# Patient Record
Sex: FEMALE | Race: BLACK OR AFRICAN AMERICAN | NOT HISPANIC OR LATINO | Employment: FULL TIME | URBAN - METROPOLITAN AREA
[De-identification: names, ages, dates, MRNs, and addresses within clinical notes are randomized per-mention and may not be internally consistent; named-entity substitution may affect disease eponyms.]

---

## 2024-03-12 ENCOUNTER — HOSPITAL ENCOUNTER (EMERGENCY)
Facility: HOSPITAL | Age: 34
Discharge: HOME | End: 2024-03-13
Payer: COMMERCIAL

## 2024-03-12 VITALS
BODY MASS INDEX: 28.79 KG/M2 | RESPIRATION RATE: 18 BRPM | WEIGHT: 190 LBS | DIASTOLIC BLOOD PRESSURE: 104 MMHG | HEIGHT: 68 IN | OXYGEN SATURATION: 95 % | SYSTOLIC BLOOD PRESSURE: 141 MMHG | HEART RATE: 96 BPM | TEMPERATURE: 97 F

## 2024-03-12 DIAGNOSIS — J45.21 MILD INTERMITTENT ASTHMA WITH EXACERBATION (HHS-HCC): Primary | ICD-10-CM

## 2024-03-12 PROCEDURE — 99285 EMERGENCY DEPT VISIT HI MDM: CPT | Mod: 25

## 2024-03-12 PROCEDURE — 99284 EMERGENCY DEPT VISIT MOD MDM: CPT | Performed by: PHYSICIAN ASSISTANT

## 2024-03-12 PROCEDURE — 94640 AIRWAY INHALATION TREATMENT: CPT

## 2024-03-12 ASSESSMENT — COLUMBIA-SUICIDE SEVERITY RATING SCALE - C-SSRS
1. IN THE PAST MONTH, HAVE YOU WISHED YOU WERE DEAD OR WISHED YOU COULD GO TO SLEEP AND NOT WAKE UP?: NO
6. HAVE YOU EVER DONE ANYTHING, STARTED TO DO ANYTHING, OR PREPARED TO DO ANYTHING TO END YOUR LIFE?: NO
2. HAVE YOU ACTUALLY HAD ANY THOUGHTS OF KILLING YOURSELF?: NO

## 2024-03-13 ENCOUNTER — APPOINTMENT (OUTPATIENT)
Dept: RADIOLOGY | Facility: HOSPITAL | Age: 34
End: 2024-03-13
Payer: COMMERCIAL

## 2024-03-13 PROCEDURE — 71046 X-RAY EXAM CHEST 2 VIEWS: CPT

## 2024-03-13 PROCEDURE — 71046 X-RAY EXAM CHEST 2 VIEWS: CPT | Performed by: RADIOLOGY

## 2024-03-13 PROCEDURE — 2500000002 HC RX 250 W HCPCS SELF ADMINISTERED DRUGS (ALT 637 FOR MEDICARE OP, ALT 636 FOR OP/ED): Performed by: PHYSICIAN ASSISTANT

## 2024-03-13 PROCEDURE — 2500000004 HC RX 250 GENERAL PHARMACY W/ HCPCS (ALT 636 FOR OP/ED): Performed by: PHYSICIAN ASSISTANT

## 2024-03-13 RX ORDER — ALBUTEROL SULFATE 90 UG/1
2 AEROSOL, METERED RESPIRATORY (INHALATION) EVERY 4 HOURS PRN
Qty: 18 G | Refills: 0 | Status: SHIPPED | OUTPATIENT
Start: 2024-03-13 | End: 2024-04-12

## 2024-03-13 RX ORDER — PREDNISONE 20 MG/1
40 TABLET ORAL ONCE
Status: COMPLETED | OUTPATIENT
Start: 2024-03-13 | End: 2024-03-13

## 2024-03-13 RX ORDER — IPRATROPIUM BROMIDE AND ALBUTEROL SULFATE 2.5; .5 MG/3ML; MG/3ML
3 SOLUTION RESPIRATORY (INHALATION) EVERY 20 MIN
Status: COMPLETED | OUTPATIENT
Start: 2024-03-13 | End: 2024-03-13

## 2024-03-13 RX ORDER — PREDNISONE 50 MG/1
50 TABLET ORAL DAILY
Qty: 5 TABLET | Refills: 0 | Status: SHIPPED | OUTPATIENT
Start: 2024-03-13 | End: 2024-03-18

## 2024-03-13 RX ADMIN — PREDNISONE 40 MG: 20 TABLET ORAL at 00:40

## 2024-03-13 RX ADMIN — IPRATROPIUM BROMIDE AND ALBUTEROL SULFATE 3 ML: .5; 3 SOLUTION RESPIRATORY (INHALATION) at 00:40

## 2024-03-13 RX ADMIN — IPRATROPIUM BROMIDE AND ALBUTEROL SULFATE 3 ML: .5; 3 SOLUTION RESPIRATORY (INHALATION) at 00:44

## 2024-03-13 RX ADMIN — IPRATROPIUM BROMIDE AND ALBUTEROL SULFATE 3 ML: .5; 3 SOLUTION RESPIRATORY (INHALATION) at 00:43

## 2024-03-13 NOTE — ED PROVIDER NOTES
HPI   Chief Complaint   Patient presents with    Shortness of Breath       HPI: Patient is a 34-year-old female with history of asthma who presents to the ED for asthma exacerbation that started a week ago.  States that she thinks that her asthma is triggered by the weather changes.  States that she has used an entire rescue inhaler throughout this week.   She notes chest tightness, shortness of breath and cough which started today.  She denies any congestion, fevers, chills or chest pain.  Denies any prior intubations or hospitalizations for her asthma.  ------------------------------------------------------------------------------------------------------------------------------------------  ROS: a ten point review of systems was performed and was negative except as per HPI.  ------------------------------------------------------------------------------------------------------------------------------------------  PMH / PSH: as per HPI, otherwise reviewed   MEDS: as per HPI, otherwise reviewed in EMR  ALLERGIES: as per HPI, otherwise reviewed in EMR  SocH:  as per HPI, otherwise reviewed in EMR  FH:  as per HPI, otherwise reviewed in EMR   ------------------------------------------------------------------------------------------------------------------------------------------  Physical Exam:  VS: As documented in the triage note and EMR flowsheet from this visit was reviewed  General: Well appearing. No acute distress.   Eyes:  Extraocular movements grossly intact. No scleral icterus.   Head: Atraumatic. Normocephalic.     Neck: No meningismus. No gross masses. Full movement through range of motion  ENT: Posterior oropharynx shows no erythema, exudate or edema.  Uvula is midline without edema.  No stridor or trismus  CV: Regular rhythm. No murmurs, rubs, gallops appreciated.   Resp: Expiratory wheezing in all lung fields. No respiratory distress.    GI: Nontender. Soft. No masses. No rebound, rigidity or guarding.    MSK: Symmetric muscle bulk. No gross step offs or deformities.  Skin: Warm, dry. No rashes  Neuro: CN II-VII intact. A&O x3. Speech fluent. Alert. Moving all extremities. Ambulates with normal gait  Psych: Appropriate mood and affect for situation  ------------------------------------------------------------------------------------------------------------------------------------------  Hospital Course / Medical Decision Making: Patient is a 34-year-old female who presents to the ED for asthma exacerbation that started this week.  On examination, patient well-appearing.  Expiratory wheezing noted on cardiopulmonary examination.  Vitals are stable.  She is speaking in full sentences, no acute respiratory distress.  Patient administered stacked DuoNeb treatments, prednisone.  Chest x-ray shows no acute cardiopulmonary process.On reassessment, patient feels markedly improved.  Lung sounds are clear.Written prescription for new albuterol inhaler and prednisone burst. Patient has remained hemodynamically stable throughout the course of their ED stay.  Patient is home-going.  Patient advised to return to the ED for any worsening symptoms.  Advised to follow-up with PCP.  Patient was discharged in stable condition.                            [place] Coma Scale Score: 15                     Patient History   No past medical history on file.  Past Surgical History:   Procedure Laterality Date     SECTION, CLASSIC  2016     Section    CT ANGIO NECK W AND WO IV CONTRAST  3/11/2013    CT NECK ANGIO W AND WO IV CONTRAST 3/11/2013 [facility] CLINICAL LEGACY    TUBAL LIGATION  2016    Tubal Ligation     No family history on file.  Social History     Tobacco Use    Smoking status: Not on file    Smokeless tobacco: Not on file   Substance Use Topics    Alcohol use: Not on file    Drug use: Not on file       Physical Exam   ED Triage Vitals [24 2338]   Temperature Heart Rate Respirations BP   36.1 °C (97  °F) 96 18 (!) 141/104      Pulse Ox Temp Source Heart Rate Source Patient Position   95 % Temporal -- --      BP Location FiO2 (%)     -- --       Physical Exam    ED Course & MDM   Diagnoses as of 03/13/24 0227   Mild intermittent asthma with exacerbation       Medical Decision Making      Procedure  Procedures     Keira Covarrubias PA-C  03/13/24 0225

## 2024-03-13 NOTE — ED TRIAGE NOTES
Patient presents to the ED for asthma, states her inhaler ran out. States she has been using it more than usual. Denies CP

## 2024-03-16 ENCOUNTER — HOSPITAL ENCOUNTER (EMERGENCY)
Facility: HOSPITAL | Age: 34
Discharge: HOME | End: 2024-03-17
Attending: EMERGENCY MEDICINE
Payer: COMMERCIAL

## 2024-03-16 VITALS
HEIGHT: 68 IN | TEMPERATURE: 97.2 F | OXYGEN SATURATION: 99 % | DIASTOLIC BLOOD PRESSURE: 97 MMHG | HEART RATE: 92 BPM | WEIGHT: 190 LBS | RESPIRATION RATE: 18 BRPM | SYSTOLIC BLOOD PRESSURE: 143 MMHG | BODY MASS INDEX: 28.79 KG/M2

## 2024-03-16 DIAGNOSIS — R30.0 DYSURIA: Primary | ICD-10-CM

## 2024-03-16 PROCEDURE — 87661 TRICHOMONAS VAGINALIS AMPLIF: CPT | Mod: 59

## 2024-03-16 PROCEDURE — 87389 HIV-1 AG W/HIV-1&-2 AB AG IA: CPT

## 2024-03-16 PROCEDURE — 99283 EMERGENCY DEPT VISIT LOW MDM: CPT

## 2024-03-16 PROCEDURE — 99284 EMERGENCY DEPT VISIT MOD MDM: CPT | Performed by: EMERGENCY MEDICINE

## 2024-03-16 PROCEDURE — 87210 SMEAR WET MOUNT SALINE/INK: CPT | Mod: 59

## 2024-03-16 PROCEDURE — 87800 DETECT AGNT MULT DNA DIREC: CPT

## 2024-03-16 PROCEDURE — 86780 TREPONEMA PALLIDUM: CPT

## 2024-03-16 PROCEDURE — 36415 COLL VENOUS BLD VENIPUNCTURE: CPT

## 2024-03-16 PROCEDURE — 81025 URINE PREGNANCY TEST: CPT

## 2024-03-16 PROCEDURE — 81003 URINALYSIS AUTO W/O SCOPE: CPT

## 2024-03-16 ASSESSMENT — COLUMBIA-SUICIDE SEVERITY RATING SCALE - C-SSRS
1. IN THE PAST MONTH, HAVE YOU WISHED YOU WERE DEAD OR WISHED YOU COULD GO TO SLEEP AND NOT WAKE UP?: NO
2. HAVE YOU ACTUALLY HAD ANY THOUGHTS OF KILLING YOURSELF?: NO
6. HAVE YOU EVER DONE ANYTHING, STARTED TO DO ANYTHING, OR PREPARED TO DO ANYTHING TO END YOUR LIFE?: NO

## 2024-03-16 ASSESSMENT — PAIN - FUNCTIONAL ASSESSMENT: PAIN_FUNCTIONAL_ASSESSMENT: 0-10

## 2024-03-16 ASSESSMENT — PAIN DESCRIPTION - PAIN TYPE: TYPE: ACUTE PAIN

## 2024-03-16 ASSESSMENT — PAIN SCALES - GENERAL: PAINLEVEL_OUTOF10: 8

## 2024-03-17 LAB
APPEARANCE UR: CLEAR
BILIRUB UR STRIP.AUTO-MCNC: NEGATIVE MG/DL
C TRACH RRNA SPEC QL NAA+PROBE: NEGATIVE
CLUE CELLS SPEC QL WET PREP: NORMAL
COLOR UR: ABNORMAL
GLUCOSE UR STRIP.AUTO-MCNC: NORMAL MG/DL
HIV 1+2 AB+HIV1 P24 AG SERPL QL IA: NONREACTIVE
HOLD SPECIMEN: NORMAL
KETONES UR STRIP.AUTO-MCNC: ABNORMAL MG/DL
LEUKOCYTE ESTERASE UR QL STRIP.AUTO: NEGATIVE
N GONORRHOEA DNA SPEC QL PROBE+SIG AMP: NEGATIVE
NITRITE UR QL STRIP.AUTO: NEGATIVE
PH UR STRIP.AUTO: 6 [PH]
PREGNANCY TEST URINE, POC: NEGATIVE
PROT UR STRIP.AUTO-MCNC: NEGATIVE MG/DL
RBC # UR STRIP.AUTO: NEGATIVE /UL
SP GR UR STRIP.AUTO: 1.02
T VAGINALIS RRNA SPEC QL NAA+PROBE: NEGATIVE
T VAGINALIS SPEC QL WET PREP: NORMAL
TREPONEMA PALLIDUM IGG+IGM AB [PRESENCE] IN SERUM OR PLASMA BY IMMUNOASSAY: NONREACTIVE
TRICHOMONAS REFLEX COMMENT: NORMAL
UROBILINOGEN UR STRIP.AUTO-MCNC: NORMAL MG/DL
WBC VAG QL WET PREP: NORMAL
YEAST VAG QL WET PREP: NORMAL

## 2024-03-17 ASSESSMENT — PAIN SCALES - GENERAL: PAINLEVEL_OUTOF10: 2

## 2024-03-17 NOTE — ED TRIAGE NOTES
Pt to ED with c/o dysuria and lower abdominal pain since today. LMP 3/3/2024.     Hx of asthma

## 2024-03-17 NOTE — ED PROVIDER NOTES
HPI   Chief Complaint   Patient presents with    Female Dysuria    Abdominal Pain       34-year-old female no stated past medical history presents today for dysuria vaginal pain and suprapubic pain.  Patient states that she has been having the symptoms for roughly 1 day, initially started with some discomfort this morning, but has been getting progressively worse.  She does also endorse an increase in her discharge, with a change in odor, but no change in color.  She denies any hematuria or urinary frequency.  She denies any nausea, vomiting, flank pain.  She denies any sore throat, fever, headache, cough, shortness of breath, chest pain.  She denies any loss of bowel or bladder incontinence.  She states that she feels like she may have a UTI.  She endorses that her LMP was 2 weeks ago, was normal in duration and was not shorter or lighter than normal.      History provided by:  Patient   used: No                        [place] Coma Scale Score: 15                     Patient History   History reviewed. No pertinent past medical history.  Past Surgical History:   Procedure Laterality Date     SECTION, CLASSIC  2016     Section    CT ANGIO NECK  3/11/2013    CT NECK ANGIO W AND WO IV CONTRAST 3/11/2013 [facility] CLINICAL LEGACY    TUBAL LIGATION  2016    Tubal Ligation     No family history on file.  Social History     Tobacco Use    Smoking status: Not on file    Smokeless tobacco: Not on file   Substance Use Topics    Alcohol use: Not on file    Drug use: Not on file       Physical Exam   ED Triage Vitals [24 2259]   Temperature Heart Rate Respirations BP   36.2 °C (97.2 °F) 92 18 (!) 143/97      Pulse Ox Temp Source Heart Rate Source Patient Position   99 % Temporal -- Sitting      BP Location FiO2 (%)     Right arm --       Physical Exam  Constitutional:       General: She is not in acute distress.     Appearance: Normal appearance. She is not ill-appearing or  diaphoretic.   HENT:      Head: Normocephalic and atraumatic.      Mouth/Throat:      Mouth: Mucous membranes are moist.      Pharynx: No oropharyngeal exudate or posterior oropharyngeal erythema.   Eyes:      General: No scleral icterus.     Extraocular Movements: Extraocular movements intact.      Pupils: Pupils are equal, round, and reactive to light.   Cardiovascular:      Rate and Rhythm: Normal rate and regular rhythm.      Pulses: Normal pulses.      Heart sounds: Normal heart sounds. No murmur heard.     No gallop.   Pulmonary:      Effort: Pulmonary effort is normal. No respiratory distress.      Breath sounds: Normal breath sounds. No stridor. No wheezing, rhonchi or rales.   Abdominal:      General: Bowel sounds are normal. There is no distension.      Palpations: Abdomen is soft. There is no mass.      Tenderness: There is abdominal tenderness in the suprapubic area and left lower quadrant. There is no right CVA tenderness, left CVA tenderness, guarding or rebound. Negative signs include Blanton's sign and McBurney's sign.      Hernia: No hernia is present.   Genitourinary:     Comments: Deferred by patient  Musculoskeletal:         General: No swelling, deformity or signs of injury. Normal range of motion.      Cervical back: Normal range of motion and neck supple. No tenderness.   Skin:     General: Skin is warm.      Capillary Refill: Capillary refill takes less than 2 seconds.      Coloration: Skin is not pale.      Findings: No erythema, lesion or rash.   Neurological:      General: No focal deficit present.      Mental Status: She is alert and oriented to person, place, and time. Mental status is at baseline.      Cranial Nerves: No cranial nerve deficit.      Motor: No weakness.   Psychiatric:         Mood and Affect: Mood normal.         Behavior: Behavior normal.         ED Course & MDM   Diagnoses as of 03/17/24 0152   Dysuria       Medical Decision Making  34-year-old female no stated past  medical history presents today for suprapubic pain and vaginal pain.  Given the rather acute onset the patient's symptoms, my concern would be that this is of infectious etiology.  Given this, we will have her self swab for trichomonas and wet prep.  We will also test her for GC and chlamydia, as well as pregnancy.  Will also get a UA on her to confirm she does not have a UTI.  Given the fact that the patient has no systemic symptoms, no fever, no tachycardia, normal blood pressure, my concern for this being Julius is low.  Addition, she has no back pain that would make me more concerned for pyelonephritis.  Once we get the patient's lab results, we will treat her for her underlying illness.  I did offer her empiric treatment, which she declined.  Patient's labs came back normal, so we will discharge her at this time.  I will recommend she use Azo to see if this helps with her discomfort. Patient comfortable with this plan.  All questions answered prior to discharge, return precaution provided.    Amount and/or Complexity of Data Reviewed  Labs: ordered.        Procedure  Procedures    Tae Tiwari MD  Resident  03/17/24 0132       Tae Tiwari MD  Resident  03/17/24 0152    --------------------------------    This patient was seen by the resident physician. I have seen and examined the patient, agree with the workup, evaluation, management and diagnosis. The care plan has been discussed and I concur.    My assessment reveals the following:    HPI:  Patient is a 35 y/o female presenting with suprapubic pain assoc with dysuria and white frothy vaginal discharge that is foul smelling. Duration of symptoms x 1 day. No F/C. No CP/SOB. No diarrhea or constipation. LMP 2 weeks ago. No back pain.     PE:  Vital signs reviewed in nursing triage note, EMR flowsheets, and at patient's bedside  GEN: Patient is awake, alert, calm, cooperative, and in mild painful and  distress.  HEAD: Normocephalic and atraumatic.  EYES:  Anicteric sclera.  MOUTH: Mucous membranes moist.  CV: Regular rate and rhythm. (+) s1/s2. No murmurs/rubs/gallops.  PULM: CTAB. No wheezes, rales, or crackles.  GI: Soft, mild suprapubic TTP, non-distended without rebound or guarding.  EXT: No deformities noted.   NEURO: Moves all extremities.   SKIN: Warm, dry. No erythema or ecchymosis.    Labs Reviewed   URINALYSIS WITH REFLEX CULTURE AND MICROSCOPIC - Abnormal       Result Value    Color, Urine Light-Yellow      Appearance, Urine Clear      Specific Gravity, Urine 1.024      pH, Urine 6.0      Protein, Urine NEGATIVE      Glucose, Urine Normal      Blood, Urine NEGATIVE      Ketones, Urine TRACE (*)     Bilirubin, Urine NEGATIVE      Urobilinogen, Urine Normal      Nitrite, Urine NEGATIVE      Leukocyte Esterase, Urine NEGATIVE     SYPHILIS SCREENING WITH REFLEX - Normal    Syphilis Total Ab Nonreactive     HIV 1/2 ANTIGEN/ANTIBODY SCREEN WIH REFLEX TO CONFIRMATION - Normal    HIV 1/2 Antigen/Antibody Screen with Reflex to Confirmation Nonreactive      Narrative:     HIV Ag/Ab screen is performed using the Siemens Andre Phillipe HIV Ag/Ab Combo assay which detects the presence of HIV p24 antigen as well as antibodies to HIV-1 (Group M and O) and HIV-2.  No laboratory evidence of HIV infection. If acute HIV infection is suspected, consider testing for HIV RNA by PCR (viral load).   C. TRACHOMATIS + N. GONORRHOEAE, AMPLIFIED - Normal    Neisseria gonorrhea,Amplified Negative      Chlamydia trachomatis, Amplified Negative      Narrative:     The APTIMA Combo 2 assay is FDA-approved NAAT using target capture for the in vitro qualitative detection and differentiation of ribosomal RNA (rRNA) for Chlamydia trachomatis and Neisseria gonorrhoeae testing on clinician-collected endocervical, PreservCyt solution liquid Pap specimens, vaginal, throat, rectal, and male urethral swab specimens; patient-collected vaginal swab specimens, and female and male urine specimens from  symptomatic and asymptomatic individuals. Samples from all other sites are not validated for this method.   TRICH VAGINALIS, AMPLIFIED - Normal    Trichomonas Vaginalis Negative      Narrative:     The APTIMA Trichomonas vaginalis assay is FDA-approved for  testing on female endocervical swabs, vaginal swabs, and  ThinPrep liquid pap samples. Performance characteristics  for Trichomonas vaginalis on specific non-FDA-approved  sample types (female and male urine and male urethral  swabs) have been validated by [facility]. This laboratory is certified by  CLIA to perform high complexity testing. Samples from all  other sites are not validated for this method.   POCT PREGNANCY, URINE - Normal    Preg Test, Ur Negative     TRICHOMONAS WET PREP REFLEX TO PCR    Trichomonas None Seen      Clue Cells None Seen      Yeast None Seen      WBC 1-2      Trichomonas reflex comment        Value: Trichomonas was not seen by wet prep. Reflex Trichomonas vaginalis by Amplified Detection.   URINALYSIS WITH REFLEX CULTURE AND MICROSCOPIC    Narrative:     The following orders were created for panel order Urinalysis with Reflex Culture and Microscopic.  Procedure                               Abnormality         Status                     ---------                               -----------         ------                     Urinalysis with Reflex C...[[id number]]  Abnormal            Final result               Extra Urine Gray Tube[[id number]]                            Final result                 Please view results for these tests on the individual orders.   EXTRA URINE GRAY TUBE    Extra Tube Hold for add-ons.         Medical Decision Making:  - UA  - STD testing  - Re-evaluation    Differential Diagnoses Considered: UTI, STD    Escalation of Care:  Appropriate for outpatient management    MD Brenden Danielle MD  03/17/24 8023

## 2024-03-17 NOTE — DISCHARGE INSTRUCTIONS
You were seen today for vaginal discomfort and burning while urinating, your urine as well as swabs were all negative.  Given this, we feel it is likely appropriate to discharge at this time.  I would follow-up with your primary care doctor or a gynecologist for further labs and examination if the symptoms do not leo.  You could potentially attempt trying Azo, which is an over-the-counter medication that can help with the symptoms.

## 2024-06-25 ENCOUNTER — LAB REQUISITION (OUTPATIENT)
Dept: LAB | Facility: HOSPITAL | Age: 34
End: 2024-06-25
Payer: COMMERCIAL

## 2024-06-25 DIAGNOSIS — R39.89 OTHER SYMPTOMS AND SIGNS INVOLVING THE GENITOURINARY SYSTEM: ICD-10-CM

## 2024-06-25 PROCEDURE — 87661 TRICHOMONAS VAGINALIS AMPLIF: CPT

## 2024-06-25 PROCEDURE — 87102 FUNGUS ISOLATION CULTURE: CPT

## 2024-06-25 PROCEDURE — 87591 N.GONORRHOEAE DNA AMP PROB: CPT

## 2024-06-25 PROCEDURE — 87491 CHLMYD TRACH DNA AMP PROBE: CPT

## 2024-06-26 LAB
C TRACH RRNA SPEC QL NAA+PROBE: NEGATIVE
N GONORRHOEA DNA SPEC QL PROBE+SIG AMP: NEGATIVE
T VAGINALIS RRNA SPEC QL NAA+PROBE: NEGATIVE

## 2024-06-27 LAB — YEAST SPEC QL CULT: ABNORMAL

## 2024-08-14 ENCOUNTER — APPOINTMENT (OUTPATIENT)
Dept: RADIOLOGY | Facility: HOSPITAL | Age: 34
End: 2024-08-14
Payer: COMMERCIAL

## 2024-08-14 ENCOUNTER — HOSPITAL ENCOUNTER (EMERGENCY)
Facility: HOSPITAL | Age: 34
Discharge: HOME | End: 2024-08-14
Payer: COMMERCIAL

## 2024-08-14 VITALS
RESPIRATION RATE: 18 BRPM | DIASTOLIC BLOOD PRESSURE: 89 MMHG | TEMPERATURE: 99 F | WEIGHT: 200 LBS | SYSTOLIC BLOOD PRESSURE: 143 MMHG | OXYGEN SATURATION: 98 % | BODY MASS INDEX: 30.31 KG/M2 | HEIGHT: 68 IN | HEART RATE: 80 BPM

## 2024-08-14 DIAGNOSIS — J45.21 MILD INTERMITTENT ASTHMA WITH EXACERBATION (HHS-HCC): ICD-10-CM

## 2024-08-14 DIAGNOSIS — J40 BRONCHITIS: Primary | ICD-10-CM

## 2024-08-14 LAB — SARS-COV-2 RNA RESP QL NAA+PROBE: NOT DETECTED

## 2024-08-14 PROCEDURE — 87635 SARS-COV-2 COVID-19 AMP PRB: CPT | Performed by: NURSE PRACTITIONER

## 2024-08-14 PROCEDURE — 2500000004 HC RX 250 GENERAL PHARMACY W/ HCPCS (ALT 636 FOR OP/ED): Mod: SE | Performed by: NURSE PRACTITIONER

## 2024-08-14 PROCEDURE — 94640 AIRWAY INHALATION TREATMENT: CPT

## 2024-08-14 PROCEDURE — 71046 X-RAY EXAM CHEST 2 VIEWS: CPT | Performed by: STUDENT IN AN ORGANIZED HEALTH CARE EDUCATION/TRAINING PROGRAM

## 2024-08-14 PROCEDURE — 99283 EMERGENCY DEPT VISIT LOW MDM: CPT | Mod: 25

## 2024-08-14 PROCEDURE — 2500000002 HC RX 250 W HCPCS SELF ADMINISTERED DRUGS (ALT 637 FOR MEDICARE OP, ALT 636 FOR OP/ED): Mod: SE | Performed by: NURSE PRACTITIONER

## 2024-08-14 PROCEDURE — 99282 EMERGENCY DEPT VISIT SF MDM: CPT

## 2024-08-14 PROCEDURE — 71046 X-RAY EXAM CHEST 2 VIEWS: CPT

## 2024-08-14 RX ORDER — ALBUTEROL SULFATE 90 UG/1
2 INHALANT RESPIRATORY (INHALATION) EVERY 4 HOURS PRN
Qty: 18 G | Refills: 0 | Status: SHIPPED | OUTPATIENT
Start: 2024-08-14 | End: 2024-09-13

## 2024-08-14 RX ORDER — PREDNISONE 20 MG/1
60 TABLET ORAL ONCE
Status: COMPLETED | OUTPATIENT
Start: 2024-08-14 | End: 2024-08-14

## 2024-08-14 RX ORDER — ALBUTEROL SULFATE 90 UG/1
2 INHALANT RESPIRATORY (INHALATION) EVERY 4 HOURS PRN
Qty: 18 G | Refills: 0 | Status: SHIPPED | OUTPATIENT
Start: 2024-08-14 | End: 2024-08-14

## 2024-08-14 RX ORDER — IPRATROPIUM BROMIDE AND ALBUTEROL SULFATE 2.5; .5 MG/3ML; MG/3ML
3 SOLUTION RESPIRATORY (INHALATION) ONCE
Status: COMPLETED | OUTPATIENT
Start: 2024-08-14 | End: 2024-08-14

## 2024-08-14 RX ORDER — PREDNISONE 20 MG/1
40 TABLET ORAL DAILY
Qty: 10 TABLET | Refills: 0 | Status: SHIPPED | OUTPATIENT
Start: 2024-08-14 | End: 2024-08-19

## 2024-08-14 RX ORDER — ALBUTEROL SULFATE 0.83 MG/ML
2.5 SOLUTION RESPIRATORY (INHALATION) EVERY 20 MIN
Status: COMPLETED | OUTPATIENT
Start: 2024-08-14 | End: 2024-08-14

## 2024-08-14 RX ORDER — BENZONATATE 100 MG/1
100 CAPSULE ORAL EVERY 8 HOURS
Qty: 21 CAPSULE | Refills: 0 | Status: SHIPPED | OUTPATIENT
Start: 2024-08-14 | End: 2024-08-21

## 2024-08-14 ASSESSMENT — ENCOUNTER SYMPTOMS
COUGH: 1
SHORTNESS OF BREATH: 1
WHEEZING: 1

## 2024-08-14 ASSESSMENT — PAIN SCALES - GENERAL: PAINLEVEL_OUTOF10: 0 - NO PAIN

## 2024-08-14 ASSESSMENT — PAIN - FUNCTIONAL ASSESSMENT: PAIN_FUNCTIONAL_ASSESSMENT: 0-10

## 2024-08-14 NOTE — ED PROVIDER NOTES
Emergency Department Encounter  [facility] EMERGENCY MEDICINE    Patient: Willy Sumner  MRN: [id number]  : 1990  Date of Evaluation: 2024  ED Provider: BREANNA Dumont      Chief Complaint       Chief Complaint   Patient presents with    Shortness of Breath        Limitations to History: none  Historian: patient  Records reviewed: EMR inpatient and outpatient notes, Care Everywhere    This is a 34-year-old female with a PMH of asthma and bronchitis who presents to the emergency room with shortness of breath and a cough.  Patient reports symptoms started last night.  Patient states that she has been using her albuterol inhaler without any relief of symptoms.  Denies any known COVID exposure.  Denies any fevers or chills.  Patient states that she has a productive cough with clear sputum. Denies any recent travel. Denies any lower extremity swelling or pain.    PMH: Asthma, bronchitis  PSH: , tubal ligation  Allergies: Reviewed per EMR  Social HX: + smoker, denies alcohol or drug use.  Family HX: No family history pertinent to current presenting problem  Medications: Reviewed per EMR    ROS:     Review of Systems   Respiratory:  Positive for cough, shortness of breath and wheezing.      14 systems reviewed and otherwise acutely negative except as in the [place].        Past History   No past medical history on file.  Past Surgical History:   Procedure Laterality Date     SECTION, CLASSIC  2016     Section    CT ANGIO NECK  3/11/2013    CT NECK ANGIO W AND WO IV CONTRAST 3/11/2013 [facility] CLINICAL LEGACY    TUBAL LIGATION  2016    Tubal Ligation         Medications/Allergies     Previous Medications    ALBUTEROL 90 MCG/ACTUATION INHALER    Inhale 2 puffs every 4 hours if needed for wheezing.     Allergies   Allergen Reactions    Iodine Hives    Shellfish Derived Hives        Physical Exam       ED Triage Vitals [24 1854]   Temperature Heart  Rate Respirations BP   37.2 °C (99 °F) 80 18 143/89      Pulse Ox Temp Source Heart Rate Source Patient Position   98 % Temporal -- --      BP Location FiO2 (%)     -- --       Physical Exam:    Appearance: Alert, oriented , cooperative,  in no acute distress. Well nourished & well hydrated.    Skin: Intact,  dry skin, no lesions, rash, petechiae or purpura.     ENT: Hearing grossly intact. External auditory canals patent, tympanic membranes intact with visible landmarks.     Neck: Supple, without meningismus.     Pulmonary: Mild expiratory wheezing throughout. No accessory muscle use or stridor.    Cardiac: Normal S1, S2 without murmur, rub, gallop or extrasystole. No JVD, Carotids without bruits.    Abdomen: Soft, nontender, active bowel sounds.  No palpable organomegaly.  No rebound or guarding.  No CVA tenderness.    Musculoskeletal: Full range of motion. no pain, edema, or deformity. Pulses full and equal. No cyanosis, clubbing, or edema.    Neurological:  Normal sensation, no weakness, no focal findings identified.    Psychiatric: Appropriate mood and affect.       Diagnostics   Labs:  Results for orders placed or performed during the hospital encounter of 08/14/24 (from the past 24 hour(s))   Sars-CoV-2 PCR   Result Value Ref Range    Coronavirus 2019, PCR Not Detected Not Detected      Radiographs:  XR chest 2 views   Final Result   1.  No evidence of acute cardiopulmonary process.        I personally reviewed the images/study and I agree with the findings   as stated by resident Shai Sheikh. This study was interpreted   at [facility], [place], [place].        MACRO:   None        Signed by: Shelby Diane 8/14/2024 8:47 PM   Dictation workstation:   [id number]                Assessment   In brief, Willy Sumner is a 34 y.o. female who presented to the emergency department with shortness of breath and a cough.        ED Course/MDM     Diagnoses as of  "08/14/24 2057   Bronchitis      Visit Vitals  /89   Pulse 80   Temp 37.2 °C (99 °F) (Temporal)   Resp 18   Ht 1.727 m (5' 8\")   Wt 90.7 kg (200 lb)   SpO2 98%   BMI 30.41 kg/m²   BSA 2.09 m²       Medications   ipratropium-albuteroL (Duo-Neb) 0.5-2.5 mg/3 mL nebulizer solution 3 mL (3 mL nebulization Given 8/14/24 1924)   albuterol 2.5 mg /3 mL (0.083 %) nebulizer solution 2.5 mg (2.5 mg nebulization Given 8/14/24 1929)   predniSONE (Deltasone) tablet 60 mg (60 mg oral Given 8/14/24 1924)       Patient remained stable while in the emergency department. Previous outpatient and ED records were reviewed. Outside records were reviewed.  Patient had mild expiratory wheezing on exam.  Differentials include bronchitis, COVID, pneumonia, influenza.  COVID swab was obtained, not detected.  Chest x-ray was obtained which shows no evidence of acute cardiopulmonary process.  Patient received a DuoNeb, 3 albuterol nebulizers and prednisone 60 mg p.o. once for symptoms.  Patient did have improvement of symptoms.  Vital signs were stable.  Symptoms likely due to bronchitis.  Patient was prescribed an albuterol inhaler, prednisone for 5 days and Tessalon Perles for cough.  Patient was advised to follow-up with her primary care doctor and return the emergency room with worsening symptoms.    Final Impression      1. Bronchitis    2. Mild intermittent asthma with exacerbation ([facility])          DISPOSITION  Disposition: Discharge home    Comment: Please note this report has been produced using speech recognition software and may contain errors related to that system including errors in grammar, punctuation, and spelling, as well as words and phrases that may be inappropriate.  If there are any questions or concerns please feel free to contact the dictating provider for clarification.    BREANNA Dumont APRN-CNP  08/14/24 2059    "

## 2024-10-10 ENCOUNTER — HOSPITAL ENCOUNTER (OUTPATIENT)
Dept: RADIOLOGY | Facility: HOSPITAL | Age: 34
Discharge: HOME | End: 2024-10-10
Payer: COMMERCIAL

## 2024-10-10 VITALS — BODY MASS INDEX: 30.31 KG/M2 | HEIGHT: 68 IN | WEIGHT: 200 LBS

## 2024-10-10 DIAGNOSIS — N63.0 UNSPECIFIED LUMP IN UNSPECIFIED BREAST: ICD-10-CM

## 2024-10-10 DIAGNOSIS — Z12.31 SCREENING MAMMOGRAM FOR BREAST CANCER: ICD-10-CM

## 2024-10-10 PROCEDURE — 76981 USE PARENCHYMA: CPT | Mod: 50,RT,LT

## 2024-10-10 PROCEDURE — 76642 ULTRASOUND BREAST LIMITED: CPT | Mod: 50

## 2024-10-10 PROCEDURE — 77062 BREAST TOMOSYNTHESIS BI: CPT

## 2024-10-14 ENCOUNTER — OFFICE VISIT (OUTPATIENT)
Dept: SURGICAL ONCOLOGY | Facility: HOSPITAL | Age: 34
End: 2024-10-14
Payer: COMMERCIAL

## 2024-10-14 VITALS
HEART RATE: 74 BPM | WEIGHT: 208.34 LBS | DIASTOLIC BLOOD PRESSURE: 90 MMHG | HEIGHT: 68 IN | SYSTOLIC BLOOD PRESSURE: 134 MMHG | TEMPERATURE: 97.2 F | RESPIRATION RATE: 17 BRPM | OXYGEN SATURATION: 97 % | BODY MASS INDEX: 31.57 KG/M2

## 2024-10-14 DIAGNOSIS — Q83.1 ACCESSORY BREAST TISSUE OF AXILLA: Primary | ICD-10-CM

## 2024-10-14 PROCEDURE — 99213 OFFICE O/P EST LOW 20 MIN: CPT | Performed by: NURSE PRACTITIONER

## 2024-10-14 PROCEDURE — 3008F BODY MASS INDEX DOCD: CPT | Performed by: NURSE PRACTITIONER

## 2024-10-14 PROCEDURE — 99203 OFFICE O/P NEW LOW 30 MIN: CPT | Performed by: NURSE PRACTITIONER

## 2024-10-14 ASSESSMENT — PAIN SCALES - GENERAL: PAINLEVEL: 4

## 2024-10-14 NOTE — PROGRESS NOTES
[facility] CANCER [place]  Willy Smuner female   1990 34 y.o.   [id number]      Chief Complaint  New patient, bilateral axillary lumps.    History Of Present Illness  Willy Sumner is a 34 y.o. AA female presenting to the breast center with bilateral axillary lumps. They are not painful but they bother her and she would like them removed. She denies breast surgery or biopsy. She has family history of breast cancer, see below.    BREAST IMAGING: 10/10/2024 Bilateral diagnostic mammogram with bilateral ultrasound, indicates BI-RADS Category 2.    REPRODUCTIVE HISTORY: menarche age 11, , first birth age 34,  x 3 months, no OCP's, premenopausal, LMP monthly, heterogeneously dense                              FAMILY CANCER HISTORY:   Maternal Grandmother: Breast cancer  Maternal Aunt (1): Breast cancer  Maternal Aunt (2): Breast cancer     Review of Systems  Constitutional:  Negative for appetite change, fatigue, fever and unexpected weight change.   HENT:  Negative for ear pain, hearing loss, nosebleeds, sore throat and trouble swallowing.    Eyes:  Negative for discharge, itching and visual disturbance.   Breast: As stated in HPI.  Respiratory:  Negative for cough, chest tightness and shortness of breath.    Cardiovascular:  Negative for chest pain, palpitations and leg swelling.   Gastrointestinal:  Negative for abdominal pain, constipation, diarrhea and nausea.   Endocrine: Negative for cold intolerance and heat intolerance.   Genitourinary:  Negative for dysuria, frequency, hematuria, pelvic pain and vaginal bleeding.   Musculoskeletal:  Negative for arthralgias, back pain, gait problem, joint swelling and myalgias.   Skin:  Negative for color change and rash.   Allergic/Immunologic: Negative for environmental allergies and food allergies.   Neurological:  Negative for dizziness, tremors, speech difficulty, weakness, numbness and headaches.   Hematological:  Does not bruise/bleed easily.    Psychiatric/Behavioral:  Negative for agitation, dysphoric mood and sleep disturbance. The patient is not nervous/anxious.       Past Medical History  She has no past medical history on file.    Surgical History  She has a past surgical history that includes Tubal ligation (2016);  section, classic (2016); and CT angio neck (3/11/2013).    Family History  Cancer-related family history includes Breast cancer in her maternal grandmother and mother's sister.     Social History  She has no history on file for tobacco use, alcohol use, and drug use.    Allergies  Iodine and Shellfish derived    Medications  Current Outpatient Medications   Medication Instructions    albuterol 90 mcg/actuation inhaler 2 puffs, inhalation, Every 4 hours PRN       Last Recorded Vitals  Vitals:    10/14/24 0958   BP: 134/90   Pulse: 74   Resp: 17   Temp: 36.2 °C (97.2 °F)   SpO2: 97%        Physical Exam  Patient is alert and oriented x3 and in a relaxed and appropriate mood. Her gait is steady and hand grasps are equal. Sclera is clear. The breasts are nearly symmetrical. The tissue is soft without palpable abnormalities, discrete nodules or masses. Bilateral axillary excessory tissue. The skin and nipples appear normal. There is no cervical, supraclavicular or axillary lymphadenopathy.       Relevant Results and Imaging  BI mammo bilateral diagnostic tomosynthesis 10/10/2024  BI US breast limited bilateral 10/10/2024    Narrative  Interpreted By:  Chapito Nicole and Liller Gregory  STUDY:  BI US BREAST LIMITED BILATERAL; BI MAMMO BILATERAL DIAGNOSTIC  TOMOSYNTHESIS;  10/10/2024 12:23 pm; 10/10/2024 11:50 am    ACCESSION NUMBER(S):  [id number]; [id number]    ORDERING CLINICIAN:  INTERFACE UNSPECIFIELDPROVIDER    INDICATION:  b    ,N63.0 Unspecified lump in unspecified breast    COMPARISON:  Mammogram 2019 and 2018    FINDINGS:  MAMMOGRAPHY: 2D and tomosynthesis images were reviewed at 1 mm  slice  thickness.    Density:  The breasts are heterogeneously dense, which may obscure  small masses.    BBs were placed at the sites of palpable concern bilaterally.  Right-greater-than-left axillary breast tissue is noted. No  suspicious masses or calcifications are identified within either  breast.    ULTRASOUND: Targeted ultrasound was performed of the bilateral  breasts at the sites of palpable concern bilaterally by a registered  sonographer with elastography.    Accessory breast tissue is seen within the bilateral axilla. No  sonographic evidence of malignancy is identified at the palpable  areas of concern.    Impression  No mammographic evidence of malignancy bilaterally. Areas of palpable  concern relates to accessory breast tissue. The patient did report  that the accessory breast tissue was bothersome. We discussed the  option of surgical consultation.    Otherwise, annual screening is recommended at age 40 unless  clinically indicated earlier.    BI-RADS CATEGORY:  BI-RADS Category:  2 Benign.  Recommendation:  Annual Screening.  Recommended Date:  Age 40 or based on Risk-Assessment.  Laterality:  Bilateral.    For any future breast imaging appointments, please call [phone] ([phone]).    I personally reviewed the images/study and I agree with the findings  as stated above by resident physician, Dr. Vitaly Rider.    MACRO:  None    Signed by: Chapito Nicole 10/10/2024 4:11 PM  Dictation workstation:   [id number]      I explained the results in depth, along with suggested explanation for follow up recommendations based on the testing results. BI-RADS Category 2    Orders  Orders Placed This Encounter   Procedures    Referral to Plastic Surgery     Standing Status:   Future     Standing Expiration Date:   10/14/2025     Referral Priority:   Routine     Referral Type:   Consultation     Referral Reason:   Consult and Treat     Referred to Provider:   Ct Hilton MD     Requested Specialty:    Plastic Surgery     Number of Visits Requested:   1       Visit Diagnosis  1. Accessory breast tissue of axilla  Referral to Plastic Surgery          Assessment/Plan   Normal clinical exam and imaging, bilateral axillary excessory tissue, no breast surgery or biopsy, no family history of breast cancer, heterogeneously dense    Plan:  Plastic surgery referral given.  Natalie: 5 year risk 0.5% and lifetime risk 16%   Risk model indicates you are considered to be at average risk for developing breast cancer      Patient Discussion/Summary  Your clinical examination and imaging are normal. Your clinical examination and imaging are normal. You no longer need to be seen by a breast specialist for an annual physical breast examination. It is important to continue annual breast exams and start mammograms at age 40 through your primary care provider.  Please return to see me if you have a new breast problem or abnormal mammogram. It has been a pleasure having you as a patient.     You can see your health information, review clinical summaries from office visits & test results online when you follow your health with MY  Chart, a personal health record. To sign up go to [email or web address]. If you need assistance with signing up or trouble getting into your account call [employer] Patient Line 24/7 at [phone].    My office phone number is [phone] if you need to get in touch with me or have additional questions or concerns. Thank you for choosing [facility] and trusting me as your healthcare provider. I look forward to seeing you again at your next office visit. I am honored to be a provider on your health care team and I remain dedicated to helping you achieve your health goals.    Neeta Solano, ATIF-CNP

## 2024-10-15 ENCOUNTER — APPOINTMENT (OUTPATIENT)
Dept: PLASTIC SURGERY | Facility: CLINIC | Age: 34
End: 2024-10-15
Payer: COMMERCIAL

## 2024-12-12 ENCOUNTER — APPOINTMENT (OUTPATIENT)
Dept: PLASTIC SURGERY | Facility: CLINIC | Age: 34
End: 2024-12-12
Payer: COMMERCIAL

## 2024-12-18 NOTE — PROGRESS NOTES
Subjective   Patient ID: Willy Sumner is a 34 y.o. female presenting as a referral from Neeta Solano CNP.    HPI Willy Sumner is a 34 y.o. AA female presenting as a referral from Neeta Solano CNP for evaluation of painful accessory axillary breast tissue.    STUDY:  BI US BREAST LIMITED BILATERAL; BI MAMMO BILATERAL DIAGNOSTIC  TOMOSYNTHESIS;  10/10/2024 12:23 pm; 10/10/2024 11:50 am      INDICATION:  N63.0 Unspecified lump in unspecified breast      COMPARISON:  Mammogram 06/21/2019 and 08/01/2018      FINDINGS:  MAMMOGRAPHY: 2D and tomosynthesis images were reviewed at 1 mm slice  thickness.      Density:  The breasts are heterogeneously dense, which may obscure  small masses.      BBs were placed at the sites of palpable concern bilaterally.  Right-greater-than-left axillary breast tissue is noted. No  suspicious masses or calcifications are identified within either  breast.      ULTRASOUND: Targeted ultrasound was performed of the bilateral  breasts at the sites of palpable concern bilaterally by a registered  sonographer with elastography.      Accessory breast tissue is seen within the bilateral axilla. No  sonographic evidence of malignancy is identified at the palpable  areas of concern.      IMPRESSION:  No mammographic evidence of malignancy bilaterally. Areas of palpable  concern relates to accessory breast tissue. The patient did report  that the accessory breast tissue was bothersome. We discussed the  option of surgical consultation.        Review of Systems   Constitutional: Negative.    HENT: Negative.     Eyes: Negative.    Respiratory:  Positive for cough and wheezing.    Cardiovascular: Negative.    Gastrointestinal: Negative.    Genitourinary: Negative.    Neurological: Negative.    Hematological: Negative.    Psychiatric/Behavioral: Negative.         Objective   Physical Exam  Vitals and nursing note reviewed. Exam conducted with a chaperone present.   Constitutional:       General: She  is not in acute distress.     Appearance: She is not ill-appearing.   HENT:      Nose: No congestion or rhinorrhea.      Mouth/Throat:      Pharynx: No oropharyngeal exudate or posterior oropharyngeal erythema.   Eyes:      Extraocular Movements: Extraocular movements intact.      Conjunctiva/sclera: Conjunctivae normal.      Pupils: Pupils are equal, round, and reactive to light.   Cardiovascular:      Rate and Rhythm: Normal rate and regular rhythm.   Pulmonary:      Effort: Pulmonary effort is normal.      Breath sounds: Normal breath sounds.   Chest:   Breasts:     Right: No swelling, inverted nipple, mass, nipple discharge, skin change or tenderness.      Left: No swelling, inverted nipple, mass, nipple discharge, skin change or tenderness.      Comments: Axillary breast tissue, right larger than the left. Firm and nodular lump at each axilla, with tenderness to touch. Excess axially skin bilaterally,   Abdominal:      General: There is no distension.      Palpations: Abdomen is soft. There is no mass.   Musculoskeletal:         General: Normal range of motion.      Cervical back: Normal range of motion and neck supple.   Neurological:      General: No focal deficit present.      Mental Status: She is oriented to person, place, and time.   Psychiatric:         Mood and Affect: Mood normal.         Behavior: Behavior normal.         Thought Content: Thought content normal.         Judgment: Judgment normal.         Assessment/Plan   Patient presents with accessory breast tissue at the axilla bilaterally. This is confirmed on US and mammogram. Patient reported pain att he right axilla and discomfort at the left axilla, difficulty wearing clothes that fit. She currently a smoker. BMI is 31.9    We discussed treatment options including non surgical, surgical excision and liposuction. Indications, contraindications, alternatives and possible risks of infection, wound healing issues, unfavorable scaring, seroma,  hematoma, numbness and paraesthesia, persistent pain or discomfort, dissatisfaction with results, and need for additional surgeries.     Based on the clinical exam which showed painful nodular mass rather than fatty tissue collection, I recommenced excision with removal of excess axillary skin. The procedure was discussed in detail with patient. Patient expressed good understanding and would like to proceed.     I reviewed with patient the negative effects of smoking on her respiratory function, wound healing , and surgical outcomes. Patient is willing to quit smoking. I recommended at least 6 weeks of smoking cessation prior to surgery with nicotine test. Patient stated that she wants to quit smoking.

## 2024-12-23 ENCOUNTER — APPOINTMENT (OUTPATIENT)
Dept: PLASTIC SURGERY | Facility: CLINIC | Age: 34
End: 2024-12-23
Payer: COMMERCIAL

## 2024-12-23 VITALS
RESPIRATION RATE: 16 BRPM | DIASTOLIC BLOOD PRESSURE: 87 MMHG | WEIGHT: 210 LBS | BODY MASS INDEX: 31.83 KG/M2 | SYSTOLIC BLOOD PRESSURE: 135 MMHG | HEART RATE: 84 BPM | HEIGHT: 68 IN

## 2024-12-23 DIAGNOSIS — Z01.818 PRE-OP TESTING: ICD-10-CM

## 2024-12-23 DIAGNOSIS — Q83.1 ACCESSORY BREAST TISSUE OF AXILLA: ICD-10-CM

## 2024-12-23 PROCEDURE — 3008F BODY MASS INDEX DOCD: CPT

## 2024-12-23 PROCEDURE — 99202 OFFICE O/P NEW SF 15 MIN: CPT

## 2024-12-23 RX ORDER — EPINEPHRINE 0.3 MG/.3ML
1 INJECTION SUBCUTANEOUS ONCE AS NEEDED
COMMUNITY
Start: 2024-10-09

## 2024-12-23 ASSESSMENT — ENCOUNTER SYMPTOMS
GASTROINTESTINAL NEGATIVE: 1
EYES NEGATIVE: 1
CONSTITUTIONAL NEGATIVE: 1
CARDIOVASCULAR NEGATIVE: 1
WHEEZING: 1
PSYCHIATRIC NEGATIVE: 1
NEUROLOGICAL NEGATIVE: 1
HEMATOLOGIC/LYMPHATIC NEGATIVE: 1
COUGH: 1

## 2024-12-23 ASSESSMENT — PAIN SCALES - GENERAL: PAINLEVEL_OUTOF10: 6

## 2025-01-02 DIAGNOSIS — Q83.1 ACCESSORY BREAST IN FEMALE: Primary | ICD-10-CM

## 2025-01-06 PROBLEM — Q83.1 ACCESSORY BREAST IN FEMALE: Status: ACTIVE | Noted: 2025-01-02

## 2025-02-19 DIAGNOSIS — Z01.818 PRE-OP EXAM: ICD-10-CM

## 2025-03-11 ENCOUNTER — TELEPHONE (OUTPATIENT)
Dept: PLASTIC SURGERY | Facility: CLINIC | Age: 35
End: 2025-03-11
Payer: COMMERCIAL

## 2025-03-19 LAB
ANABASINE UR-MCNC: <2 NG/ML
COTININE UR-MCNC: 4 NG/ML
NICOTINE UR-MCNC: <2 NG/ML
NORCOTININE UR-MCNC: <2 NG/ML
QUEST 3-OH-COTININE, URINE: <2 NG/ML
QUEST NORNICOTINE,UR: <2 NG/ML

## 2025-04-03 ENCOUNTER — ANESTHESIA (OUTPATIENT)
Dept: OPERATING ROOM | Facility: CLINIC | Age: 35
End: 2025-04-03
Payer: COMMERCIAL

## 2025-04-03 ENCOUNTER — SURGERY (OUTPATIENT)
Age: 35
End: 2025-04-03
Payer: COMMERCIAL

## 2025-04-03 ENCOUNTER — ANESTHESIA EVENT (OUTPATIENT)
Dept: OPERATING ROOM | Facility: CLINIC | Age: 35
End: 2025-04-03
Payer: COMMERCIAL

## 2025-04-03 ENCOUNTER — HOSPITAL ENCOUNTER (OUTPATIENT)
Facility: CLINIC | Age: 35
Setting detail: OUTPATIENT SURGERY
Discharge: HOME | End: 2025-04-03
Payer: COMMERCIAL

## 2025-04-03 VITALS
HEIGHT: 68 IN | HEART RATE: 72 BPM | OXYGEN SATURATION: 100 % | DIASTOLIC BLOOD PRESSURE: 89 MMHG | BODY MASS INDEX: 32.08 KG/M2 | RESPIRATION RATE: 16 BRPM | WEIGHT: 211.64 LBS | SYSTOLIC BLOOD PRESSURE: 144 MMHG | TEMPERATURE: 97.2 F

## 2025-04-03 DIAGNOSIS — Q83.1 ACCESSORY BREAST IN FEMALE: Primary | ICD-10-CM

## 2025-04-03 DIAGNOSIS — G89.18 PAIN ASSOCIATED WITH SURGICAL PROCEDURE: ICD-10-CM

## 2025-04-03 PROBLEM — J45.909 ASTHMA: Status: ACTIVE | Noted: 2025-04-03

## 2025-04-03 PROCEDURE — 2500000004 HC RX 250 GENERAL PHARMACY W/ HCPCS (ALT 636 FOR OP/ED): Performed by: ANESTHESIOLOGIST ASSISTANT

## 2025-04-03 PROCEDURE — 3600000008 HC OR TIME - EACH INCREMENTAL 1 MINUTE - PROCEDURE LEVEL THREE

## 2025-04-03 PROCEDURE — 3700000002 HC GENERAL ANESTHESIA TIME - EACH INCREMENTAL 1 MINUTE

## 2025-04-03 PROCEDURE — 7100000001 HC RECOVERY ROOM TIME - INITIAL BASE CHARGE

## 2025-04-03 PROCEDURE — 19120 REMOVAL OF BREAST LESION: CPT

## 2025-04-03 PROCEDURE — A19120 PR EXCISE BREAST CYST: Performed by: ANESTHESIOLOGIST ASSISTANT

## 2025-04-03 PROCEDURE — 3700000001 HC GENERAL ANESTHESIA TIME - INITIAL BASE CHARGE

## 2025-04-03 PROCEDURE — 2500000001 HC RX 250 WO HCPCS SELF ADMINISTERED DRUGS (ALT 637 FOR MEDICARE OP): Performed by: ANESTHESIOLOGY

## 2025-04-03 PROCEDURE — 88305 TISSUE EXAM BY PATHOLOGIST: CPT | Mod: TC,SUR,ELYLAB,WESLAB

## 2025-04-03 PROCEDURE — 3600000003 HC OR TIME - INITIAL BASE CHARGE - PROCEDURE LEVEL THREE

## 2025-04-03 PROCEDURE — 2720000007 HC OR 272 NO HCPCS

## 2025-04-03 PROCEDURE — A19120 PR EXCISE BREAST CYST: Performed by: ANESTHESIOLOGY

## 2025-04-03 PROCEDURE — 7100000010 HC PHASE TWO TIME - EACH INCREMENTAL 1 MINUTE

## 2025-04-03 PROCEDURE — 88305 TISSUE EXAM BY PATHOLOGIST: CPT | Performed by: STUDENT IN AN ORGANIZED HEALTH CARE EDUCATION/TRAINING PROGRAM

## 2025-04-03 PROCEDURE — 7100000009 HC PHASE TWO TIME - INITIAL BASE CHARGE

## 2025-04-03 PROCEDURE — 7100000002 HC RECOVERY ROOM TIME - EACH INCREMENTAL 1 MINUTE

## 2025-04-03 RX ORDER — PROPOFOL 10 MG/ML
INJECTION, EMULSION INTRAVENOUS CONTINUOUS PRN
Status: DISCONTINUED | OUTPATIENT
Start: 2025-04-03 | End: 2025-04-03

## 2025-04-03 RX ORDER — LIDOCAINE HYDROCHLORIDE 10 MG/ML
0.1 INJECTION, SOLUTION EPIDURAL; INFILTRATION; INTRACAUDAL; PERINEURAL ONCE
Status: DISCONTINUED | OUTPATIENT
Start: 2025-04-03 | End: 2025-04-03 | Stop reason: HOSPADM

## 2025-04-03 RX ORDER — SODIUM CHLORIDE, SODIUM LACTATE, POTASSIUM CHLORIDE, CALCIUM CHLORIDE 600; 310; 30; 20 MG/100ML; MG/100ML; MG/100ML; MG/100ML
100 INJECTION, SOLUTION INTRAVENOUS CONTINUOUS
Status: DISCONTINUED | OUTPATIENT
Start: 2025-04-03 | End: 2025-04-03 | Stop reason: HOSPADM

## 2025-04-03 RX ORDER — HYDROMORPHONE HYDROCHLORIDE 1 MG/ML
0.4 INJECTION, SOLUTION INTRAMUSCULAR; INTRAVENOUS; SUBCUTANEOUS EVERY 5 MIN PRN
Status: DISCONTINUED | OUTPATIENT
Start: 2025-04-03 | End: 2025-04-03 | Stop reason: HOSPADM

## 2025-04-03 RX ORDER — MIDAZOLAM HYDROCHLORIDE 1 MG/ML
INJECTION, SOLUTION INTRAMUSCULAR; INTRAVENOUS AS NEEDED
Status: DISCONTINUED | OUTPATIENT
Start: 2025-04-03 | End: 2025-04-03

## 2025-04-03 RX ORDER — HYDROMORPHONE HYDROCHLORIDE 1 MG/ML
0.2 INJECTION, SOLUTION INTRAMUSCULAR; INTRAVENOUS; SUBCUTANEOUS EVERY 5 MIN PRN
Status: DISCONTINUED | OUTPATIENT
Start: 2025-04-03 | End: 2025-04-03 | Stop reason: HOSPADM

## 2025-04-03 RX ORDER — ONDANSETRON HYDROCHLORIDE 2 MG/ML
4 INJECTION, SOLUTION INTRAVENOUS ONCE AS NEEDED
Status: DISCONTINUED | OUTPATIENT
Start: 2025-04-03 | End: 2025-04-03 | Stop reason: HOSPADM

## 2025-04-03 RX ORDER — LIDOCAINE HYDROCHLORIDE 20 MG/ML
INJECTION, SOLUTION INFILTRATION; PERINEURAL AS NEEDED
Status: DISCONTINUED | OUTPATIENT
Start: 2025-04-03 | End: 2025-04-03

## 2025-04-03 RX ORDER — ONDANSETRON HYDROCHLORIDE 2 MG/ML
INJECTION, SOLUTION INTRAVENOUS AS NEEDED
Status: DISCONTINUED | OUTPATIENT
Start: 2025-04-03 | End: 2025-04-03

## 2025-04-03 RX ORDER — FENTANYL CITRATE 50 UG/ML
INJECTION, SOLUTION INTRAMUSCULAR; INTRAVENOUS AS NEEDED
Status: DISCONTINUED | OUTPATIENT
Start: 2025-04-03 | End: 2025-04-03

## 2025-04-03 RX ORDER — CEPHALEXIN 500 MG/1
500 CAPSULE ORAL 3 TIMES DAILY
Qty: 42 CAPSULE | Refills: 0 | Status: SHIPPED | OUTPATIENT
Start: 2025-04-03 | End: 2025-04-17

## 2025-04-03 RX ORDER — LABETALOL HYDROCHLORIDE 5 MG/ML
5 INJECTION, SOLUTION INTRAVENOUS ONCE AS NEEDED
Status: DISCONTINUED | OUTPATIENT
Start: 2025-04-03 | End: 2025-04-03 | Stop reason: HOSPADM

## 2025-04-03 RX ORDER — ACETAMINOPHEN 500 MG
1000 TABLET ORAL EVERY 8 HOURS
Qty: 84 TABLET | Refills: 0 | Status: SHIPPED | OUTPATIENT
Start: 2025-04-03 | End: 2025-04-17

## 2025-04-03 RX ORDER — METOCLOPRAMIDE HYDROCHLORIDE 5 MG/ML
10 INJECTION INTRAMUSCULAR; INTRAVENOUS ONCE AS NEEDED
Status: DISCONTINUED | OUTPATIENT
Start: 2025-04-03 | End: 2025-04-03 | Stop reason: HOSPADM

## 2025-04-03 RX ORDER — KETOROLAC TROMETHAMINE 30 MG/ML
INJECTION, SOLUTION INTRAMUSCULAR; INTRAVENOUS AS NEEDED
Status: DISCONTINUED | OUTPATIENT
Start: 2025-04-03 | End: 2025-04-03

## 2025-04-03 RX ORDER — GABAPENTIN 300 MG/1
CAPSULE ORAL AS NEEDED
Status: DISCONTINUED | OUTPATIENT
Start: 2025-04-03 | End: 2025-04-03

## 2025-04-03 RX ORDER — CEFAZOLIN 1 G/1
INJECTION, POWDER, FOR SOLUTION INTRAVENOUS AS NEEDED
Status: DISCONTINUED | OUTPATIENT
Start: 2025-04-03 | End: 2025-04-03

## 2025-04-03 RX ORDER — HYDROMORPHONE HYDROCHLORIDE 1 MG/ML
INJECTION, SOLUTION INTRAMUSCULAR; INTRAVENOUS; SUBCUTANEOUS AS NEEDED
Status: DISCONTINUED | OUTPATIENT
Start: 2025-04-03 | End: 2025-04-03

## 2025-04-03 RX ORDER — OXYCODONE HYDROCHLORIDE 5 MG/1
5 TABLET ORAL EVERY 8 HOURS PRN
Qty: 9 TABLET | Refills: 0 | Status: SHIPPED | OUTPATIENT
Start: 2025-04-03 | End: 2025-04-04 | Stop reason: SDUPTHER

## 2025-04-03 RX ORDER — CELECOXIB 200 MG/1
200 CAPSULE ORAL 2 TIMES DAILY
Qty: 28 CAPSULE | Refills: 0 | Status: SHIPPED | OUTPATIENT
Start: 2025-04-03 | End: 2025-04-17

## 2025-04-03 RX ORDER — ACETAMINOPHEN 325 MG/1
TABLET ORAL AS NEEDED
Status: DISCONTINUED | OUTPATIENT
Start: 2025-04-03 | End: 2025-04-03

## 2025-04-03 RX ORDER — ALBUTEROL SULFATE 0.83 MG/ML
2.5 SOLUTION RESPIRATORY (INHALATION) ONCE AS NEEDED
Status: DISCONTINUED | OUTPATIENT
Start: 2025-04-03 | End: 2025-04-03 | Stop reason: HOSPADM

## 2025-04-03 RX ORDER — OXYCODONE HYDROCHLORIDE 5 MG/1
5 TABLET ORAL EVERY 4 HOURS PRN
Status: DISCONTINUED | OUTPATIENT
Start: 2025-04-03 | End: 2025-04-03 | Stop reason: HOSPADM

## 2025-04-03 RX ADMIN — DEXAMETHASONE SODIUM PHOSPHATE 4 MG: 4 INJECTION, SOLUTION INTRAMUSCULAR; INTRAVENOUS at 10:03

## 2025-04-03 RX ADMIN — FENTANYL CITRATE 50 MCG: 50 INJECTION, SOLUTION INTRAMUSCULAR; INTRAVENOUS at 09:57

## 2025-04-03 RX ADMIN — LIDOCAINE HYDROCHLORIDE 80 MG: 20 INJECTION, SOLUTION INFILTRATION; PERINEURAL at 09:57

## 2025-04-03 RX ADMIN — PROPOFOL 100 MCG/KG/MIN: 10 INJECTION, EMULSION INTRAVENOUS at 10:00

## 2025-04-03 RX ADMIN — PROPOFOL 200 MG: 10 INJECTION, EMULSION INTRAVENOUS at 09:57

## 2025-04-03 RX ADMIN — MIDAZOLAM 2 MG: 1 INJECTION INTRAMUSCULAR; INTRAVENOUS at 09:52

## 2025-04-03 RX ADMIN — SODIUM CHLORIDE, POTASSIUM CHLORIDE, SODIUM LACTATE AND CALCIUM CHLORIDE: 600; 310; 30; 20 INJECTION, SOLUTION INTRAVENOUS at 09:52

## 2025-04-03 RX ADMIN — HYDROMORPHONE HYDROCHLORIDE 0.5 MG: 1 INJECTION, SOLUTION INTRAMUSCULAR; INTRAVENOUS; SUBCUTANEOUS at 10:46

## 2025-04-03 RX ADMIN — ONDANSETRON 4 MG: 2 INJECTION, SOLUTION INTRAMUSCULAR; INTRAVENOUS at 11:14

## 2025-04-03 RX ADMIN — GABAPENTIN 300 MG: 300 CAPSULE ORAL at 08:05

## 2025-04-03 RX ADMIN — ACETAMINOPHEN 975 MG: 325 TABLET, FILM COATED ORAL at 08:05

## 2025-04-03 RX ADMIN — CEFAZOLIN 2 G: 1 INJECTION, POWDER, FOR SOLUTION INTRAMUSCULAR; INTRAVENOUS at 10:00

## 2025-04-03 RX ADMIN — KETOROLAC TROMETHAMINE 30 MG: 30 INJECTION, SOLUTION INTRAMUSCULAR; INTRAVENOUS at 11:14

## 2025-04-03 RX ADMIN — OXYCODONE HYDROCHLORIDE 5 MG: 5 TABLET ORAL at 12:23

## 2025-04-03 RX ADMIN — FENTANYL CITRATE 50 MCG: 50 INJECTION, SOLUTION INTRAMUSCULAR; INTRAVENOUS at 10:03

## 2025-04-03 RX ADMIN — HYDROMORPHONE HYDROCHLORIDE 0.5 MG: 1 INJECTION, SOLUTION INTRAMUSCULAR; INTRAVENOUS; SUBCUTANEOUS at 10:31

## 2025-04-03 ASSESSMENT — PAIN SCALES - GENERAL
PAINLEVEL_OUTOF10: 5 - MODERATE PAIN
PAINLEVEL_OUTOF10: 0 - NO PAIN
PAINLEVEL_OUTOF10: 5 - MODERATE PAIN
PAINLEVEL_OUTOF10: 4
PAINLEVEL_OUTOF10: 5 - MODERATE PAIN
PAINLEVEL_OUTOF10: 4

## 2025-04-03 ASSESSMENT — PAIN - FUNCTIONAL ASSESSMENT
PAIN_FUNCTIONAL_ASSESSMENT: 0-10

## 2025-04-03 ASSESSMENT — COLUMBIA-SUICIDE SEVERITY RATING SCALE - C-SSRS
2. HAVE YOU ACTUALLY HAD ANY THOUGHTS OF KILLING YOURSELF?: NO
1. IN THE PAST MONTH, HAVE YOU WISHED YOU WERE DEAD OR WISHED YOU COULD GO TO SLEEP AND NOT WAKE UP?: NO
6. HAVE YOU EVER DONE ANYTHING, STARTED TO DO ANYTHING, OR PREPARED TO DO ANYTHING TO END YOUR LIFE?: NO

## 2025-04-03 ASSESSMENT — PAIN DESCRIPTION - LOCATION: LOCATION: BREAST

## 2025-04-03 ASSESSMENT — PAIN DESCRIPTION - DESCRIPTORS: DESCRIPTORS: ACHING

## 2025-04-03 NOTE — DISCHARGE INSTRUCTIONS
Plastic and Reconstructive Surgery Post Operative Instructions  You had surgery today at [facility] for removal of excess axillary tissue with Dr. Valdez of plastic and reconstructive surgery. Included below are post-operative instructions and details regarding follow-up.     Thank you for allowing us to participate in your care and we wish you the best!    Best Regards,  [facility]  Department of Plastic and Reconstructive Surgery    Activity:  Activity as tolerated. Start walking short distances as soon as possible, as this helps to reduce swelling and lowers the chance of blood clots. No pushing, pulling or lifting objects greater than 5 pounds. You may elevate your head of bed and arms to alleviate postoperative edema.     You may not shower until cleared by MD. Avoid soaking or submerging surgical incisions/sites. You may take a sponge bath, avoiding the incisions.     Surgical Site/Wound Care:  Axillary wound care recs:     You have surgical glue in place in both axilla. The glue will stay in place for 2-3 weeks and will fall off on its own.    You have 2 drains in place. Please empty and record the output every 8 hours or as needed. To empty the drain, open the cap, tip into cup and squeeze to empty. Squeeze drain flat then replace the cap. Please empty the drain and record its output 3 times a day and bring these numbers to your follow up appointment.  The drain output should decrease and the color of the drainage should become lighter (red to pink to yellow).  This drain is sutured into place.  Keep the area around the drain clean and dry. Call the office if you notice drastic changes in drain output, bloody drain output, or redness/drainage around insertion site.    Avoid application of creams, lotions or ointments to surgical site, no soaking or scrubbing of surgical sites.     Please do not apply ice or heat directly to the skin as feeling to the  area may be diminished.    Please notify our office immediately if developing signs of infection which include increased redness, swelling, fever/chills, green/yellow drainage, or foul odor from wound. Plastic Surgery office line: [phone].      3 weeks after surgery you may begin to massage your incisions with body lotion, BioOil, or scar cream. Do not use 100% vitamin E as it can cause skin irritation.    Avoid exposing scars to sun for at least 12 months. Always use a strong sunblock if sun exposure is unavoidable (SPF 30 or greater).    Nutrition:  You may resume a regular diet following surgery. Ensure that you are drinking an adequate amount of fluids to maintain hydration as well as consuming a diet high in protein and low in sugar. You may consider increasing your fiber intake to avoid constipation.    Do not smoke, as smoking delays healing and increases the risk of complications. Also be sure you are not around people that smoke for at least 6 weeks after surgery.  Second hand smoke is just as harmful as if you were to smoke.    Medication Instructions:  You may resume use of your home prescribed mediations as previously directed following discharge from the hospital. If you were taking medications prior to your surgery and they are not listed on your discharge homegoing instructions medications list, consult your MD before you resume these medications.    Some postoperative pain is not unusual. This is usually relieved by taking prescribed or over the counter Acetaminophen/Tylenol, Motrin/ibuprofen. In cases of severe pain, you may use prescribed oxycodone as directed. Severe pain despite administration of pain medication must be reported to your physician.    Remember when taking Acetaminophen, do NOT exceed more than 1000 milligrams (mg) per dose or more than 4000 mg total per day. Taking too much Acetaminophen at one time can damage your liver. The maximum amount of ibuprofen in adults is 800 mg  per dose or 3200 mg per day. Call your MD if you have any questions about your medications. To prevent constipation while taking narcotic pain medications, please utilize your prescribed bowel regimen, ensure that you drink plenty of water, eat fiber rich foods (a good source is fruit) and increase activity progressively.    DO NOT drive a car while utilizing narcotic pain medications and until cleared by MD at follow-up appointment. Driving or operating heavy machinery, lawnmowers or power tools while taking opiod/narcotic pain medications may impair your judgement.    Call Physician If:  Call your MD or seek immediate medical attention if you experience any of the following symptoms:  1. Fever of 101.5 (38.5 C) or greater  2. Pain not controlled with prescribed pain medications  3. Uncontrolled nausea and/or vomiting  4. Drainage or swelling around your incisions and/or surgical sites   5. Separation of incisions, or tearing of the incision line  6. Large fluid collection under or around the incision or flap sites   7. Flap discoloration (including darkened appearance)  8. Difficulty breathing  9. Swelling, pain, heat and/or redness in your legs and/or calves  10. Inability to tolerate diet/fluid intake    Contact the plastic surgery office for any questions and/or concerns regarding the surgical incision/site.  1. [phone] if Monday-Friday (8 a.m. - 4:30 p.m.)  2. [phone] and ask for the Plastic Surgeon bo if after hours or on weekends  3. Please send a picture with any wound concerns to our plastic surgery email at [email or web address]    Follow-up/Post Discharge Appointments:  Follow-up care is a key part of your treatment and safety. It is very important that you maintain follow-up care as directed so that your surgical site heals properly and does not lead to problems. Always carry a current medication list with you and bring it to ALL healthcare Provider visits. Be sure to maintain  follow up with plastic surgery at your scheduled appointment. If you are unable to keep your appointment, or need to reschedule please contact our office at [phone].    Please be in contact with our office regarding your drain output. We will schedule your follow up appointment based on how much output your drains are having    Next dose of tylenol at 2pm if needed  Next dose of motrin at 515pm if needed  Next dose of oxycodone at 830pm if needed

## 2025-04-03 NOTE — BRIEF OP NOTE
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.1]     Procedures  EXCISION, LESION, BREAST  09826 - IA EXC CYST/ABERRANT BREAST TISSUE OPEN 1/> LESION      Surgeons      * Mauricio Valdez - Primary    Resident/Fellow/Other Assistant:  Surgeons and Role:  * No surgeons found with a matching role *    Staff:   Circulator: Anna  Scrub Person: Fatemeh Raya Circulator: Torie  Relief Scrub: Torie    Anesthesia Staff: Anesthesiologist: Mahi Chang DO  C-AA: APOLINAR Torres    Procedure Summary  Anesthesia: General  ASA: II  Estimated Blood Loss: <5mL  Intra-op Medications:   Administrations occurring from 0838 to 0953 on 25:   Medication Name Total Dose   LR bolus Cannot be calculated   midazolam (Versed) 1 mg/1 mL 2 mg              Anesthesia Record               Intraprocedure I/O Totals          Intake    LR bolus 800.00 mL    Total Intake 800 mL          Specimen:   ID Type Source Tests Collected by Time   1 : left accessory breast tissue Tissue SOFT TISSUE RESECTION SURGICAL PATHOLOGY EXAM Mauricio Valdez MD 4/3/2025 1035   2 : RIGHT ACCESSORY BREAST TISSUE Tissue SOFT TISSUE MASS RESECTION SURGICAL PATHOLOGY EXAM Mauricio Valdez MD 4/3/2025 1130                  Findings: Excess breast tissue in bilateral axilla     Complications:  None; patient tolerated the procedure well.     Disposition: PACU - hemodynamically stable.  Condition: stable  Specimens Collected:   ID Type Source Tests Collected by Time   1 : left accessory breast tissue Tissue SOFT TISSUE RESECTION SURGICAL PATHOLOGY EXAM Mauricio Valdez MD 4/3/2025 1035   2 : RIGHT ACCESSORY BREAST TISSUE Tissue SOFT TISSUE MASS RESECTION SURGICAL PATHOLOGY EXAM Mauricio Valdez MD 4/3/2025 1130     Attending Attestation:     Mauricio Amaya  [place]  Phone Number: [phone]

## 2025-04-03 NOTE — ANESTHESIA POSTPROCEDURE EVALUATION
Patient: Willy Sumner    Procedure Summary       Date: 04/03/25 Room / Location: [facility] OR 01 / Virtual [facility] OR    Anesthesia Start: 0952 Anesthesia Stop:     Procedure: EXCISION, LESION, BREAST (Bilateral) Diagnosis:       Accessory breast in female      (Accessory breast in female [Q83.1])    Surgeons: Mauricio Valdez MD Responsible Provider: Mahi Chang DO    Anesthesia Type: general ASA Status: 2            Anesthesia Type: general    Vitals Value Taken Time   /100 04/03/25 1157   Temp 36 04/03/25 1157   Pulse 94 04/03/25 1157   Resp 16 04/03/25 1157   SpO2 98 04/03/25 1157       Anesthesia Post Evaluation    Patient location during evaluation: PACU  Patient participation: complete - patient cannot participate  Level of consciousness: sleepy but conscious  Pain management: adequate  Airway patency: patent  Cardiovascular status: acceptable  Respiratory status: acceptable and spontaneous ventilation  Hydration status: acceptable  Postoperative Nausea and Vomiting: none        There were no known notable events for this encounter.

## 2025-04-03 NOTE — PROGRESS NOTES
Subjective :  Patient ID: Willy Sumner is a 35 y.o. female.    History of Present Illness: Patient presents today post op from surgery on 4/3/25 for bilateral breast lesion excision.     Review of Systems  ROS: All 10 systems were reviewed and are unremarkable except for those mentioned in HPI.     Objective :  Physical Exam  Vitals and nursing note reviewed. Exam conducted with a chaperone present.   Constitutional:       General: not in acute distress.     Appearance: not ill-appearing.   Eyes:      Extraocular Movements: Extraocular movements intact.      Conjunctiva/sclera: Conjunctivae normal.      Pupils: Pupils are equal, round, and reactive to light.   Cardiovascular:      Rate and Rhythm: Normal rate and regular rhythm.      Pulses: Normal pulses.   Pulmonary:      Effort: Pulmonary effort is normal.      Breath sounds: Normal breath sounds.   Abdominal:      Palpations: Abdomen is soft. There is no mass.      Tenderness: There is no abdominal tenderness.      Hernia: No hernia is present.   Musculoskeletal:         General: No swelling or tenderness.      Cervical back: Normal range of motion and neck supple.   Skin:     Capillary Refill: Capillary refill takes less than 2 seconds.      Coloration: Skin is not jaundiced.      Findings: No bruising or rash.   Neurological:      General: No focal deficit present.      Mental Status: oriented to person, place, and time.   Psychiatric:         Mood and Affect: Mood normal.         Behavior: Behavior normal.         Thought Content: Thought content normal.         Judgment: Judgment normal.     Assessment/Plan :  {Assess/PlanSmartLinks:60412}   breast tissue from both axillas.  She has been doing very well after surgery.  She denies pain but disclose discomfort with the drain.  No signs of infection were appreciated on exam.  Drains output was reviewed.  Left side drain was removed.  The right side drain was kept.    We discussed with patient drain care and we will be following up with her closely for drain removal.  Pathology report is not back yet.  Once available we will discuss the results with the patient if needed.    Plan  Continue drain care  Will review drain output daily.

## 2025-04-03 NOTE — OP NOTE
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]     Procedures  EXCISION, LESION, BREAST  84041 - WA EXC CYST/ABERRANT BREAST TISSUE OPEN 1/> LESION  Bilateral     Surgeons      * Mauricio Valdez - Primary    Resident/Fellow/Other Assistant:  Surgeons and Role:  * No surgeons found with a matching role *    Staff:   Circulator: Anna  Scrub Person: Fatemeh Raya Circulator: Torie  Relief Scrub: Torie    Anesthesia Staff: Anesthesiologist: DO BLANKA Borges-AA: APOLINAR Torres    Procedure Summary  Anesthesia: General  ASA: II  Estimated Blood Loss: <5mL  Intra-op Medications:   Administrations occurring from 0838 to 0953 on 25:   Medication Name Total Dose   LR bolus Cannot be calculated   midazolam (Versed) 1 mg/1 mL 2 mg              Anesthesia Record               Intraprocedure I/O Totals       None           Specimen:   ID Type Source Tests Collected by Time   1 : left accessory breast tissue Tissue SOFT TISSUE RESECTION SURGICAL PATHOLOGY EXAM Mauricio Valdez MD 4/3/2025 1035   2 : RIGHT ACCESSORY BREAST TISSUE Tissue SOFT TISSUE MASS RESECTION SURGICAL PATHOLOGY EXAM Mauricio Valdez MD 4/3/2025 1130                 Drains and/or Catheters:   Closed/Suction Drain 1 Left 15 Fr. (Active)       Closed/Suction Drain Inferior;Lateral;Right Breast 15 Fr. (Active)       Closed/Suction Drain Inferior;Lateral;Left Breast (Active)       Tourniquet Times:         Implants:     Findings: Tissue excised from the left axilla measured 8 x 6.5 cm², tissue excised from the right axilla measured 7 x 5 cm².    Indications: Willy Sumner is an 35 y.o. 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     The patient was seen in the  preoperative area. The risks, benefits, complications, treatment options, non-operative alternatives, expected recovery and outcomes were discussed with the patient. The possibilities of reaction to medication, pulmonary aspiration, injury to surrounding structures, bleeding, recurrent infection, the need for additional procedures, failure to diagnose a condition, and creating a complication requiring transfusion or operation were discussed with the patient. The patient concurred with the proposed plan, giving informed consent.  The site of surgery was properly noted/marked if necessary per policy. The patient has been actively warmed in preoperative area. Preoperative antibiotics have been ordered and given within 1 hours of incision. Venous thrombosis prophylaxis have been ordered including bilateral sequential compression devices    Procedure Details:   Standard safety huddle was performed as soon as the patient entered the operating theater.  She was identified by 3 identifiers.  Procedure, laterality, allergies, need for antibiotics, estimated blood loss, duration of surgery were all reviewed.  All agreed to proceed with surgery.  The patient was then transferred to operating bed, she was placed supine, she was held secured by safety belt, SCDs were applied to both legs, arms were supported on arm boards placed at less than 90 degrees with the trunk.  General anesthesia with endotracheal intubation was performed by anesthesia personnel.  The surgical site was then prepped with CHG solution and draped in standard sterile fashion.  Timeout was then performed.  She was given antibiotics before incision.    I proceeded with excision of the left accessory breast at the left axilla first.  The tissue was palpated, and the amount of excision was marked and then lazy S incision was made in the axilla with a 15 blade scalpel and and electrocautery for hemostasis.  The accessory breast tissue was excised en bloc down to  the muscle fascia.  Meticulous hemostasis was maintained.  The pocket was irrigated with saline.  Axillary skin was redraped and excess skin was determined and excised with 15 blade scalpel.  Closure was achieved with size 3-0 PDS for deep dermal repair, and size 4-0 Monocryl for subcuticular repair.  A 15 Malawian channel drain was left in the pocket.  Dermabond was applied to the incision.  Same procedure was performed on the right side.  Excised tissue was sent for further histopathological studies.    Complications:  None; patient tolerated the procedure well.    Disposition: PACU - hemodynamically stable.  Condition: stable       Additional Details: Patient can be discharged home if medically stable.  Oral diet and advance as tolerated.  Multimodal pain control.  Short course of oral antibiotics.  Follow-up on pathology results.  Drain care instructions.  We will be monitoring the drain output with the patient and based on drain output we will schedule a return visit for drain removal.  I will limit shoulder abduction for under 45 degrees.  Can shower after 48 hours.    Attending Attestation: I performed the procedure.    Mauricio Valdez  Phone Number: [phone]

## 2025-04-03 NOTE — H&P
History Of Present Illness  Willy Sumner is a 35 y.o. female presenting to the outpatient surgery center at [facility] for removal of bilateral breast lesions and excision of excess axillary skin. She was seen by Dr. Valdez in clinic on 24. She previously had a US and mammogram which confirmed accessory breast tissue at bilateral axilla. She is feeling well this morning and denies any changes to her medical history since she was seen by Dr. Valdez in December.      Past Medical History  Past Medical History:   Diagnosis Date    Asthma        Surgical History  Past Surgical History:   Procedure Laterality Date     SECTION, CLASSIC  2016     Section     SECTION, LOW TRANSVERSE      CT ANGIO NECK  2013    CT NECK ANGIO W AND WO IV CONTRAST 3/11/2013 [facility]GAC[facility]    LIPOSUCTION      TUBAL LIGATION  2016    Tubal Ligation        Social History  She reports that she has quit smoking. Her smoking use included cigarettes. She has been exposed to tobacco smoke. She has never used smokeless tobacco. She reports that she does not drink alcohol and does not use drugs.    Family History  Family History   Problem Relation Name Age of Onset    Breast cancer Maternal Grandmother          ?age    Breast cancer Mother's Sister          ?age x3        Allergies  Iodine and Shellfish derived    Review of Systems   All other systems reviewed and are negative.       Physical Exam  Constitutional:       General: She is not in acute distress.  HENT:      Mouth/Throat:      Mouth: Mucous membranes are moist.   Eyes:      Extraocular Movements: Extraocular movements intact.   Cardiovascular:      Rate and Rhythm: Normal rate and regular rhythm.   Pulmonary:      Effort: No respiratory distress.   Abdominal:      General: There is no distension.      Palpations: Abdomen is soft.      Tenderness: There is no abdominal tenderness.   Musculoskeletal:         General: Normal range of  motion.   Skin:     General: Skin is warm and dry.   Neurological:      General: No focal deficit present.      Mental Status: She is alert.          Last Recorded Vitals  There were no vitals taken for this visit.    Relevant Results             Assessment/Plan   Assessment & Plan  Accessory breast in female      To OR as planned for excision of bilateral breast lesions and removal of excess axillary skin. Informed consent obtained in pre-op.            Radha Yancey MD

## 2025-04-03 NOTE — ANESTHESIA PROCEDURE NOTES
Airway  Date/Time: 4/3/2025 9:59 AM  Urgency: elective    Airway not difficult    Staffing  Performed: APOLINAR   Authorized by: Mahi Chang DO    Performed by: APOLINAR Torres  Patient location during procedure: OR    Indications and Patient Condition  Indications for airway management: anesthesia and airway protection  Spontaneous ventilation: present  Sedation level: deep  Preoxygenated: yes  Patient position: sniffing  MILS maintained throughout  Mask difficulty assessment: 1 - vent by mask    Final Airway Details  Final airway type: supraglottic airway      Successful airway: classic  Size 4     Number of attempts at approach: 1  Number of other approaches attempted: 0

## 2025-04-03 NOTE — ANESTHESIA PREPROCEDURE EVALUATION
Patient: Willy Sumner    Procedure Information       Date/Time: 04/03/25 0838    Procedure: EXCISION, LESION, BREAST (Bilateral) - LMA is acceptable    Location: [facility] OR 01 / [facility]    Surgeons: Mauricio Valdez MD            Relevant Problems   Anesthesia (within normal limits)      Cardiac (within normal limits)      Pulmonary   (+) Asthma (Well controlled)      Neuro (within normal limits)      GI (within normal limits)      /Renal (within normal limits)      Liver (within normal limits)      Endocrine (within normal limits)      Hematology (within normal limits)      Musculoskeletal (within normal limits)      HEENT (within normal limits)      ID (within normal limits)      Skin (within normal limits)      GYN (within normal limits)       Clinical information reviewed:   Tobacco  Allergies  Meds   Med Hx  Surg Hx  OB Status  Fam Hx  Soc   Hx        NPO Detail:  NPO/Void Status  Date of Last Liquid: 04/02/25  Time of Last Liquid: 2000  Date of Last Solid: 04/02/25  Time of Last Solid: 2000  Last Intake Type: Clear fluids; Food  Time of Last Void: 0700         Physical Exam    Airway  Mallampati: III  TM distance: >3 FB  Neck ROM: full     Cardiovascular    Dental   Comments: braces   Pulmonary    Abdominal        Anesthesia Plan    History of general anesthesia?: yes  History of complications of general anesthesia?: no    ASA 2     general     intravenous induction   Anesthetic plan and risks discussed with patient.    Plan discussed with CAA.

## 2025-04-04 DIAGNOSIS — G89.18 PAIN ASSOCIATED WITH SURGICAL PROCEDURE: ICD-10-CM

## 2025-04-04 RX ORDER — OXYCODONE HYDROCHLORIDE 5 MG/1
5 TABLET ORAL EVERY 8 HOURS PRN
Qty: 9 TABLET | Refills: 0 | Status: SHIPPED | OUTPATIENT
Start: 2025-04-04 | End: 2025-04-07

## 2025-04-07 ENCOUNTER — APPOINTMENT (OUTPATIENT)
Dept: PLASTIC SURGERY | Facility: CLINIC | Age: 35
End: 2025-04-07
Payer: COMMERCIAL

## 2025-04-07 VITALS
DIASTOLIC BLOOD PRESSURE: 92 MMHG | SYSTOLIC BLOOD PRESSURE: 147 MMHG | HEART RATE: 80 BPM | BODY MASS INDEX: 32.08 KG/M2 | RESPIRATION RATE: 18 BRPM | WEIGHT: 211 LBS

## 2025-04-07 DIAGNOSIS — Z98.890 STATUS POST SURGERY: Primary | ICD-10-CM

## 2025-04-07 DIAGNOSIS — Z48.03 CHANGE OR REMOVAL OF DRAINS: ICD-10-CM

## 2025-04-07 PROCEDURE — 99024 POSTOP FOLLOW-UP VISIT: CPT

## 2025-04-07 ASSESSMENT — PAIN SCALES - GENERAL: PAINLEVEL_OUTOF10: 7

## 2025-04-07 NOTE — PROGRESS NOTES
Subjective :  Patient ID: Willy Sumner is a 35 y.o. female.    History of Present Illness: Patient presents today post op from surgery on 4/3/25 for bilateral axillary tissue excision. Reports discomfort at site of ANCELMO drains, but otherwise her pain is reasonably well-controlled. Reports drain output as follows:   Post op day 1 R 75ml L 50ml  Post op day 2 R 50ml L 10 ml  Post op day 3 R 25 mL L 10ml    Has not been keeping her drains to suction; she reports she did not recall that her drains should be on bulb suction following drain teaching. Has had leakage of fluid around her right drain. Is very eager for drains to be removed. Has no other concerns at this time.    Remains compliant with her antibiotics. No fevers or chills, cellulitis. Has been washing her axilla with hibiclens but no showers.    Review of Systems  ROS: All 10 systems were reviewed and are unremarkable except for those mentioned in HPI.     Objective :  Physical Exam  Vitals and nursing note reviewed. Exam conducted with a chaperone present.     GEN: No acute distress. Alert, awake and conversant.  HEENT: Sclera anicteric. Moist mucous membranes.  RESP: Breathing non-labored, equal chest rise. On RA.  CV: Regular rate, normotensive  MSK: No gross deformities. Moves all extremities spontaneously. Bilateral axillary drains in place. Left drain with scant serosanguineous output. Right drain with moderate serosanguineous output to bulb as well as some leakage of serous fluid around drain site. Incisions appropriate for postoperative course.  NEURO: Alert and oriented x3. No focal deficits.  PSYCH: Appropriate mood and affect.    Assessment/Plan   Doing well postoperatively. Her left drain has been putting out minimal serous fluid and was removed in clinic today. Her right drain remains high output. Provided drain teaching to patient and have asked her to keep the drains to suction.     We have requested that patient provide us with daily updates  regarding her drain output and that when her output is less than 20 for two consecutive days we will bring her in to clinic and remove her right drain.    Her left drain was removed; betadine paint deferred due to her iodine allergy. Triple antibiotic cream placed over drain site. She will complete her course of antibiotics and follow up with us when her drain output decreases for drain removal.    Her surgical pathology is still pending; we will reach out to her when it comes back.    Seen and discussed with Dr. Valdez.    Darnell Dupree MD  PGY-2 General Surgery

## 2025-04-08 ENCOUNTER — TELEPHONE (OUTPATIENT)
Dept: PLASTIC SURGERY | Facility: CLINIC | Age: 35
End: 2025-04-08
Payer: COMMERCIAL

## 2025-04-08 NOTE — TELEPHONE ENCOUNTER
Spoke with patient to get an update on drain care and output. States her ANCELMO drain is holding suction and she is able to measure the output like I showed her in clinic yesterday without difficulty. She endorses no fever, N/V, diarrhea or pain.

## 2025-04-15 ENCOUNTER — OFFICE VISIT (OUTPATIENT)
Dept: PLASTIC SURGERY | Facility: CLINIC | Age: 35
End: 2025-04-15
Payer: COMMERCIAL

## 2025-04-15 DIAGNOSIS — B37.9 YEAST INFECTION: Primary | ICD-10-CM

## 2025-04-15 RX ORDER — FLUCONAZOLE 150 MG/1
150 TABLET ORAL ONCE
Qty: 1 TABLET | Refills: 0 | Status: SHIPPED | OUTPATIENT
Start: 2025-04-15 | End: 2025-04-15

## 2025-04-15 ASSESSMENT — PAIN SCALES - GENERAL: PAINLEVEL_OUTOF10: 0-NO PAIN

## 2025-04-16 LAB
LABORATORY COMMENT REPORT: NORMAL
PATH REPORT.COMMENTS IMP SPEC: NORMAL
PATH REPORT.FINAL DX SPEC: NORMAL
PATH REPORT.GROSS SPEC: NORMAL
PATH REPORT.RELEVANT HX SPEC: NORMAL
PATH REPORT.TOTAL CANCER: NORMAL

## 2025-04-22 ENCOUNTER — TELEPHONE (OUTPATIENT)
Dept: PLASTIC SURGERY | Facility: CLINIC | Age: 35
End: 2025-04-22
Payer: COMMERCIAL

## 2025-04-22 NOTE — TELEPHONE ENCOUNTER
Patient doing good post op. Has some mild soreness with arm movement or reaching. Denies any signs or symptoms of infection. ANCELMO drain was removed and patient reports no issues or swelling at the site. Will reach out with any questions or concerns.

## 2025-05-15 ENCOUNTER — TELEPHONE (OUTPATIENT)
Dept: PLASTIC SURGERY | Facility: CLINIC | Age: 35
End: 2025-05-15
Payer: COMMERCIAL

## 2025-05-15 NOTE — TELEPHONE ENCOUNTER
Patient sent Epic chat message with concerns of arm pain and bulge. Called patient and left message to call our office back.

## 2025-05-16 NOTE — PROGRESS NOTES
Subjective :  Patient ID: Willy Sumner is a 35 y.o. female.    History of Present Illness: Patient presents today post op from surgery on 4/3/25 for bilateral axillary tissue excision.       Review of Systems  ROS: All 10 systems were reviewed and are unremarkable except for those mentioned in HPI.     Objective :  Physical Exam  Vitals and nursing note reviewed. Exam conducted with a chaperone present.   Constitutional:       General: not in acute distress.     Appearance: not ill-appearing.   Eyes:      Extraocular Movements: Extraocular movements intact.      Conjunctiva/sclera: Conjunctivae normal.      Pupils: Pupils are equal, round, and reactive to light.   Cardiovascular:      Rate and Rhythm: Normal rate and regular rhythm.      Pulses: Normal pulses.   Pulmonary:      Effort: Pulmonary effort is normal.      Breath sounds: Normal breath sounds.   Abdominal:      Palpations: Abdomen is soft. There is no mass.      Tenderness: There is no abdominal tenderness.      Hernia: No hernia is present.   Musculoskeletal:         General: No swelling or tenderness.      Cervical back: Normal range of motion and neck supple.   Skin:     Capillary Refill: Capillary refill takes less than 2 seconds.      Coloration: Skin is not jaundiced.      Findings: No bruising or rash.   Neurological:      General: No focal deficit present.      Mental Status: oriented to person, place, and time.   Psychiatric:         Mood and Affect: Mood normal.         Behavior: Behavior normal.         Thought Content: Thought content normal.         Judgment: Judgment normal.     Assessment/Plan

## 2025-05-18 ENCOUNTER — APPOINTMENT (OUTPATIENT)
Dept: RADIOLOGY | Facility: HOSPITAL | Age: 35
End: 2025-05-18
Payer: COMMERCIAL

## 2025-05-18 ENCOUNTER — HOSPITAL ENCOUNTER (EMERGENCY)
Facility: HOSPITAL | Age: 35
Discharge: HOME | End: 2025-05-19
Attending: EMERGENCY MEDICINE
Payer: COMMERCIAL

## 2025-05-18 VITALS
OXYGEN SATURATION: 97 % | HEIGHT: 68 IN | BODY MASS INDEX: 31.98 KG/M2 | TEMPERATURE: 96.4 F | RESPIRATION RATE: 18 BRPM | SYSTOLIC BLOOD PRESSURE: 151 MMHG | DIASTOLIC BLOOD PRESSURE: 95 MMHG | HEART RATE: 84 BPM | WEIGHT: 211 LBS

## 2025-05-18 DIAGNOSIS — J45.21 MILD INTERMITTENT ASTHMA WITH EXACERBATION (HHS-HCC): Primary | ICD-10-CM

## 2025-05-18 LAB — PREGNANCY TEST URINE, POC: NEGATIVE

## 2025-05-18 PROCEDURE — 81025 URINE PREGNANCY TEST: CPT

## 2025-05-18 PROCEDURE — 99285 EMERGENCY DEPT VISIT HI MDM: CPT | Performed by: EMERGENCY MEDICINE

## 2025-05-18 PROCEDURE — 94640 AIRWAY INHALATION TREATMENT: CPT | Mod: 59

## 2025-05-18 PROCEDURE — 93010 ELECTROCARDIOGRAM REPORT: CPT | Performed by: EMERGENCY MEDICINE

## 2025-05-18 RX ORDER — IPRATROPIUM BROMIDE AND ALBUTEROL SULFATE 2.5; .5 MG/3ML; MG/3ML
3 SOLUTION RESPIRATORY (INHALATION) EVERY 20 MIN
Status: DISPENSED | OUTPATIENT
Start: 2025-05-18 | End: 2025-05-19

## 2025-05-18 RX ORDER — ACETAMINOPHEN 325 MG/1
650 TABLET ORAL ONCE
Status: COMPLETED | OUTPATIENT
Start: 2025-05-18 | End: 2025-05-19

## 2025-05-18 RX ORDER — AZITHROMYCIN 500 MG/1
500 TABLET, FILM COATED ORAL ONCE
Status: COMPLETED | OUTPATIENT
Start: 2025-05-18 | End: 2025-05-19

## 2025-05-18 ASSESSMENT — COLUMBIA-SUICIDE SEVERITY RATING SCALE - C-SSRS
6. HAVE YOU EVER DONE ANYTHING, STARTED TO DO ANYTHING, OR PREPARED TO DO ANYTHING TO END YOUR LIFE?: NO
2. HAVE YOU ACTUALLY HAD ANY THOUGHTS OF KILLING YOURSELF?: NO
1. IN THE PAST MONTH, HAVE YOU WISHED YOU WERE DEAD OR WISHED YOU COULD GO TO SLEEP AND NOT WAKE UP?: NO

## 2025-05-18 ASSESSMENT — PAIN - FUNCTIONAL ASSESSMENT: PAIN_FUNCTIONAL_ASSESSMENT: 0-10

## 2025-05-18 ASSESSMENT — PAIN SCALES - GENERAL: PAINLEVEL_OUTOF10: 0 - NO PAIN

## 2025-05-18 NOTE — Clinical Note
Willy Sumner was seen and treated in our emergency department on 5/18/2025.  She may return to work on 05/21/2025.       If you have any questions or concerns, please don't hesitate to call.      Sameer Mcclendon MD

## 2025-05-19 ENCOUNTER — APPOINTMENT (OUTPATIENT)
Dept: PLASTIC SURGERY | Facility: CLINIC | Age: 35
End: 2025-05-19

## 2025-05-19 ENCOUNTER — APPOINTMENT (OUTPATIENT)
Dept: RADIOLOGY | Facility: HOSPITAL | Age: 35
End: 2025-05-19
Payer: COMMERCIAL

## 2025-05-19 ENCOUNTER — CLINICAL SUPPORT (OUTPATIENT)
Dept: EMERGENCY MEDICINE | Facility: HOSPITAL | Age: 35
End: 2025-05-19
Payer: COMMERCIAL

## 2025-05-19 LAB
ATRIAL RATE: 99 BPM
CARDIAC TROPONIN I PNL SERPL HS: <3 NG/L (ref 0–34)
FLUAV RNA RESP QL NAA+PROBE: NOT DETECTED
FLUBV RNA RESP QL NAA+PROBE: NOT DETECTED
P AXIS: 74 DEGREES
P OFFSET: 213 MS
P ONSET: 157 MS
PR INTERVAL: 132 MS
Q ONSET: 223 MS
QRS COUNT: 16 BEATS
QRS DURATION: 76 MS
QT INTERVAL: 350 MS
QTC CALCULATION(BAZETT): 449 MS
QTC FREDERICIA: 413 MS
R AXIS: 64 DEGREES
RSV RNA RESP QL NAA+PROBE: NOT DETECTED
SARS-COV-2 RNA RESP QL NAA+PROBE: NOT DETECTED
T AXIS: 26 DEGREES
T OFFSET: 398 MS
VENTRICULAR RATE: 99 BPM

## 2025-05-19 PROCEDURE — 71046 X-RAY EXAM CHEST 2 VIEWS: CPT

## 2025-05-19 PROCEDURE — 2500000004 HC RX 250 GENERAL PHARMACY W/ HCPCS (ALT 636 FOR OP/ED)

## 2025-05-19 PROCEDURE — 2500000002 HC RX 250 W HCPCS SELF ADMINISTERED DRUGS (ALT 637 FOR MEDICARE OP, ALT 636 FOR OP/ED): Mod: JZ

## 2025-05-19 PROCEDURE — 36415 COLL VENOUS BLD VENIPUNCTURE: CPT

## 2025-05-19 PROCEDURE — 71046 X-RAY EXAM CHEST 2 VIEWS: CPT | Performed by: SURGERY

## 2025-05-19 PROCEDURE — 2500000001 HC RX 250 WO HCPCS SELF ADMINISTERED DRUGS (ALT 637 FOR MEDICARE OP)

## 2025-05-19 PROCEDURE — 93005 ELECTROCARDIOGRAM TRACING: CPT

## 2025-05-19 PROCEDURE — 87637 SARSCOV2&INF A&B&RSV AMP PRB: CPT

## 2025-05-19 PROCEDURE — 84484 ASSAY OF TROPONIN QUANT: CPT

## 2025-05-19 RX ORDER — PREDNISONE 20 MG/1
40 TABLET ORAL ONCE
Status: COMPLETED | OUTPATIENT
Start: 2025-05-19 | End: 2025-05-19

## 2025-05-19 RX ORDER — AZITHROMYCIN 250 MG/1
250 TABLET, FILM COATED ORAL DAILY
Qty: 4 TABLET | Refills: 0 | Status: SHIPPED | OUTPATIENT
Start: 2025-05-20

## 2025-05-19 RX ORDER — PREDNISONE 20 MG/1
40 TABLET ORAL DAILY
Qty: 10 TABLET | Refills: 0 | Status: SHIPPED | OUTPATIENT
Start: 2025-05-19 | End: 2025-05-24

## 2025-05-19 RX ADMIN — IPRATROPIUM BROMIDE AND ALBUTEROL SULFATE 3 ML: .5; 3 SOLUTION RESPIRATORY (INHALATION) at 00:56

## 2025-05-19 RX ADMIN — PREDNISONE 40 MG: 20 TABLET ORAL at 00:33

## 2025-05-19 RX ADMIN — IPRATROPIUM BROMIDE AND ALBUTEROL SULFATE 3 ML: .5; 3 SOLUTION RESPIRATORY (INHALATION) at 00:19

## 2025-05-19 RX ADMIN — ACETAMINOPHEN 650 MG: 325 TABLET ORAL at 00:32

## 2025-05-19 RX ADMIN — AZITHROMYCIN DIHYDRATE 500 MG: 500 TABLET ORAL at 00:33

## 2025-05-19 NOTE — DISCHARGE INSTRUCTIONS
You have been evaluated in the Emergency Department today for shortness of breath. Your evaluation, including chest x-ray, EKG, lab work, suggests that your symptoms are due to an asthma exacerbation.  Please take the prescriptions we have provided you.  Please follow-up with your primary care physician.    Please follow up with your primary care physician within two days. If you do not have a primary care doctor you may call [phone] to make an appointment.    Return to the Emergency Department if you experience chest pain, shortness of breath, difficulty breathing, getting lightheaded or pass out. Return to the Emergency Department if you develop any new or worsening symptoms.   Thank you for choosing us for your care.

## 2025-05-19 NOTE — ED PROVIDER NOTES
History of Present Illness     History provided by: Patient  Limitations to History: None  External Records Reviewed:     HPI:  Willy Sumner is a 35 y.o. female with a past medical history pertinent for asthma presents to the emergency department for evaluation of shortness of breath.  Patient states she feels like she is having an asthma flareup and has been wheezing.  Patient states intermittent chest discomfort when she is coughing but not otherwise.  She denies pleuritic pain, leg swelling, claudication.  She denies orthopnea or dyspnea on exertion.  Patient states she has been using her inhaler at home but has not helped her.  She denies any difficulty eating or drinking or dysuria.    Physical Exam   Triage vitals:  T 35.8 °C (96.4 °F)  HR 84  BP (!) 151/95  RR 18  O2 97 % None (Room air)    Patient is awake, alert, oriented, no acute distress, resting comfortably on hospital chair  Speaks in full sentences with no respiratory distress  Trachea is midline  Lungs are diffusely wheezy inspiratory and expiratory bilaterally, no accessory muscle usage, no stridor  2+ radial pulses with no delay in capillary refill  No pitting peripheral edema  Abdomen is soft nontender nondistended      Medical Decision Making & ED Course   Medical Decision Makin y.o. female notably hypertensive but otherwise hemodynamically stable presents to the emergency department for shortness of breath.  Patient is wheezing consistent with asthma exacerbation.  Patient has a negative pregnancy test and we will give her oral prednisone.  Will obtain EKG, chest x-ray, single troponin.  Single troponin within normal limits, negative pregnancy test, negative COVID/flu/RSV swab.  Patient had a negative chest x-ray as well as a nonconcerning EKG.  Patient ambulated in the department without desaturation.  Patient's presentation consistent with a viral URI exacerbating her underlying asthma.  She was symptomatically improved after  Chaparrobs, oral prednisone, oral azithromycin.  She was given prescriptions for oral prednisone and azithromycin.  She was given discharge instructions and return precautions.  Patient was discharged.  ----         Social Determinants of Health which Significantly Impact Care: None identified       Chronic conditions affecting the patient's care: See HPI    The patient was discussed with the following consultants/services: Please see ED course for consult transcript        ED Course:  ED Course as of 05/19/25 0233   Mon May 19, 2025   0002 Ambulates without difficulty in the department. [GA]   0202 EKG shows normal sinus rhythm rate of 99 bpm, normal axis, normal intervals, no ST or T wave abnormalities indicative acute ischemia or electrolyte abnormalities. [GA]   0202 Saturated well during an ambulatory trial [GA]   0231 On repeat examination the patient is not wheezing. [GA]      ED Course User Index  [GA] Sameer Mcclendon MD         Diagnoses as of 05/19/25 0233   Mild intermittent asthma with exacerbation ([facility]HS-HCC)     Disposition   As a result of the work-up, the patient was discharged home.  she was informed of her diagnosis and instructed to come back with any concerns or worsening of condition.  she and was agreeable to the plan as discussed above.  she was given the opportunity to ask questions.  All of the patient's questions were answered.    Procedures   Procedures    Patient was seen and discussed with the attending of record.    Sameer Mcclendon MD  Emergency Medicine     Sameer Mcclendon MD  Resident  05/19/25 0233

## 2025-05-19 NOTE — ED TRIAGE NOTES
Pt presents to the ED d/t an asthma flare up. Pt states she is SOB and has been wheezing. Pt denies any other issues at this time. Pt is AxOx4 and ambulatory upon arrival.

## 2025-05-20 ENCOUNTER — TELEPHONE (OUTPATIENT)
Dept: PLASTIC SURGERY | Facility: CLINIC | Age: 35
End: 2025-05-20
Payer: COMMERCIAL

## (undated) DEVICE — BLADE, OPHTHALMIC, MINI, STRAIGHT, DOUBLE BEVEL, SHARP ALL AROUND

## (undated) DEVICE — DRAPE, CONVERTORS SPLIT SHEET 224CM X 291 CM, W/ADH SPLIT

## (undated) DEVICE — DRESSING, GAUZE, PETROLATUM, XEROFORM, 5 X 9 IN, STERILE

## (undated) DEVICE — GLOVE, SURGICAL, PROTEXIS PI BLUE W/NEUTHERA, 7.5, PF, LF

## (undated) DEVICE — NEEDLE, MICRODISSECTION STR 4CM

## (undated) DEVICE — DRESSING, TELFA, 3X4

## (undated) DEVICE — CORD, CAUTERY, BIOPOLAR FORCEP, 12FT

## (undated) DEVICE — PREP TRAY, SKIN, DRY, W/GLOVES

## (undated) DEVICE — APPLICATOR, CHLORAPREP, W/ORANGE TINT, 26ML

## (undated) DEVICE — ELECTRODE, INSULATED BLADE, EDGE, W/ 2.75 SLEEVE

## (undated) DEVICE — Device

## (undated) DEVICE — GLOVE, SURGICAL, PROTEXIS PI , 7.0, PF, LF

## (undated) DEVICE — MARKER, SKIN, XFINE TIP, W/RULER AND LABELS

## (undated) DEVICE — ADHESIVE, SKIN, DERMABOND ADVANCED, 15CM, PEN-STYLE

## (undated) DEVICE — SPONGE, GAUZE, XRAY DECT, 16 PLY, 4 X 4, W/MASTER DMT,STERILE

## (undated) DEVICE — NEEDLE, HYPODERMIC, REGULAR WALL, REGULAR BEVEL, 18 G X 1.5 IN